# Patient Record
Sex: FEMALE | Race: WHITE | Employment: OTHER | URBAN - METROPOLITAN AREA
[De-identification: names, ages, dates, MRNs, and addresses within clinical notes are randomized per-mention and may not be internally consistent; named-entity substitution may affect disease eponyms.]

---

## 2018-07-23 ENCOUNTER — HOSPITAL ENCOUNTER (OUTPATIENT)
Age: 78
Discharge: HOME | End: 2018-07-25
Payer: MEDICARE

## 2018-07-23 PROCEDURE — 80061 LIPID PANEL: CPT

## 2018-07-23 PROCEDURE — 85025 COMPLETE CBC W/AUTO DIFF WBC: CPT

## 2018-07-23 PROCEDURE — 80053 COMPREHEN METABOLIC PANEL: CPT

## 2018-07-24 LAB
ALBUMIN SERPL-MCNC: 4.2 G/DL (ref 3.5–5.2)
ALP BLD-CCNC: 60 U/L (ref 35–104)
ALT SERPL-CCNC: 11 U/L (ref 0–32)
ANION GAP SERPL CALCULATED.3IONS-SCNC: 19 MMOL/L (ref 7–16)
AST SERPL-CCNC: 22 U/L (ref 0–31)
BASOPHILS ABSOLUTE: 0.04 E9/L (ref 0–0.2)
BASOPHILS RELATIVE PERCENT: 0.7 % (ref 0–2)
BILIRUB SERPL-MCNC: 0.3 MG/DL (ref 0–1.2)
BUN BLDV-MCNC: 15 MG/DL (ref 8–23)
CALCIUM SERPL-MCNC: 9.8 MG/DL (ref 8.6–10.2)
CHLORIDE BLD-SCNC: 97 MMOL/L (ref 98–107)
CHOLESTEROL, TOTAL: 192 MG/DL (ref 0–199)
CO2: 22 MMOL/L (ref 22–29)
CREAT SERPL-MCNC: 0.8 MG/DL (ref 0.5–1)
EOSINOPHILS ABSOLUTE: 0.1 E9/L (ref 0.05–0.5)
EOSINOPHILS RELATIVE PERCENT: 1.7 % (ref 0–6)
GFR AFRICAN AMERICAN: >60
GFR NON-AFRICAN AMERICAN: >60 ML/MIN/1.73
GLUCOSE BLD-MCNC: 73 MG/DL (ref 74–109)
HCT VFR BLD CALC: 38.4 % (ref 34–48)
HDLC SERPL-MCNC: 65 MG/DL
HEMOGLOBIN: 11.9 G/DL (ref 11.5–15.5)
IMMATURE GRANULOCYTES #: 0.02 E9/L
IMMATURE GRANULOCYTES %: 0.3 % (ref 0–5)
LDL CHOLESTEROL CALCULATED: 108 MG/DL (ref 0–99)
LYMPHOCYTES ABSOLUTE: 2.11 E9/L (ref 1.5–4)
LYMPHOCYTES RELATIVE PERCENT: 36.6 % (ref 20–42)
MCH RBC QN AUTO: 28.1 PG (ref 26–35)
MCHC RBC AUTO-ENTMCNC: 31 % (ref 32–34.5)
MCV RBC AUTO: 90.8 FL (ref 80–99.9)
MONOCYTES ABSOLUTE: 0.47 E9/L (ref 0.1–0.95)
MONOCYTES RELATIVE PERCENT: 8.2 % (ref 2–12)
NEUTROPHILS ABSOLUTE: 3.02 E9/L (ref 1.8–7.3)
NEUTROPHILS RELATIVE PERCENT: 52.5 % (ref 43–80)
PDW BLD-RTO: 14.4 FL (ref 11.5–15)
PLATELET # BLD: 360 E9/L (ref 130–450)
PMV BLD AUTO: 10.5 FL (ref 7–12)
POTASSIUM SERPL-SCNC: 4.6 MMOL/L (ref 3.5–5)
RBC # BLD: 4.23 E12/L (ref 3.5–5.5)
SODIUM BLD-SCNC: 138 MMOL/L (ref 132–146)
TOTAL PROTEIN: 7.8 G/DL (ref 6.4–8.3)
TRIGL SERPL-MCNC: 95 MG/DL (ref 0–149)
VLDLC SERPL CALC-MCNC: 19 MG/DL
WBC # BLD: 5.8 E9/L (ref 4.5–11.5)

## 2018-10-22 ENCOUNTER — HOSPITAL ENCOUNTER (OUTPATIENT)
Age: 78
Discharge: HOME | End: 2018-10-24
Payer: MEDICARE

## 2018-10-22 PROCEDURE — 85025 COMPLETE CBC W/AUTO DIFF WBC: CPT

## 2018-10-22 PROCEDURE — 80053 COMPREHEN METABOLIC PANEL: CPT

## 2018-10-22 PROCEDURE — 80061 LIPID PANEL: CPT

## 2018-10-23 LAB
ALBUMIN SERPL-MCNC: 4.2 G/DL (ref 3.5–5.2)
ALP BLD-CCNC: 59 U/L (ref 35–104)
ALT SERPL-CCNC: 11 U/L (ref 0–32)
ANION GAP SERPL CALCULATED.3IONS-SCNC: 15 MMOL/L (ref 7–16)
AST SERPL-CCNC: 17 U/L (ref 0–31)
BASOPHILS ABSOLUTE: 0.06 E9/L (ref 0–0.2)
BASOPHILS RELATIVE PERCENT: 0.8 % (ref 0–2)
BILIRUB SERPL-MCNC: 0.4 MG/DL (ref 0–1.2)
BUN BLDV-MCNC: 18 MG/DL (ref 8–23)
CALCIUM SERPL-MCNC: 9.5 MG/DL (ref 8.6–10.2)
CHLORIDE BLD-SCNC: 98 MMOL/L (ref 98–107)
CHOLESTEROL, TOTAL: 188 MG/DL (ref 0–199)
CO2: 22 MMOL/L (ref 22–29)
CREAT SERPL-MCNC: 0.9 MG/DL (ref 0.5–1)
EOSINOPHILS ABSOLUTE: 0.13 E9/L (ref 0.05–0.5)
EOSINOPHILS RELATIVE PERCENT: 1.8 % (ref 0–6)
GFR AFRICAN AMERICAN: >60
GFR NON-AFRICAN AMERICAN: >60 ML/MIN/1.73
GLUCOSE BLD-MCNC: 80 MG/DL (ref 74–109)
HCT VFR BLD CALC: 37.9 % (ref 34–48)
HDLC SERPL-MCNC: 65 MG/DL
HEMOGLOBIN: 12.3 G/DL (ref 11.5–15.5)
IMMATURE GRANULOCYTES #: 0.03 E9/L
IMMATURE GRANULOCYTES %: 0.4 % (ref 0–5)
LDL CHOLESTEROL CALCULATED: 103 MG/DL (ref 0–99)
LYMPHOCYTES ABSOLUTE: 2.18 E9/L (ref 1.5–4)
LYMPHOCYTES RELATIVE PERCENT: 30.2 % (ref 20–42)
MCH RBC QN AUTO: 28.6 PG (ref 26–35)
MCHC RBC AUTO-ENTMCNC: 32.5 % (ref 32–34.5)
MCV RBC AUTO: 88.1 FL (ref 80–99.9)
MONOCYTES ABSOLUTE: 0.63 E9/L (ref 0.1–0.95)
MONOCYTES RELATIVE PERCENT: 8.7 % (ref 2–12)
NEUTROPHILS ABSOLUTE: 4.2 E9/L (ref 1.8–7.3)
NEUTROPHILS RELATIVE PERCENT: 58.1 % (ref 43–80)
PDW BLD-RTO: 14.4 FL (ref 11.5–15)
PLATELET # BLD: 314 E9/L (ref 130–450)
PMV BLD AUTO: 11.4 FL (ref 7–12)
POTASSIUM SERPL-SCNC: 4.8 MMOL/L (ref 3.5–5)
RBC # BLD: 4.3 E12/L (ref 3.5–5.5)
SODIUM BLD-SCNC: 135 MMOL/L (ref 132–146)
TOTAL PROTEIN: 7.3 G/DL (ref 6.4–8.3)
TRIGL SERPL-MCNC: 99 MG/DL (ref 0–149)
VLDLC SERPL CALC-MCNC: 20 MG/DL
WBC # BLD: 7.2 E9/L (ref 4.5–11.5)

## 2018-10-30 ENCOUNTER — OFFICE VISIT (OUTPATIENT)
Dept: ENT CLINIC | Age: 78
End: 2018-10-30
Payer: MEDICARE

## 2018-10-30 VITALS
BODY MASS INDEX: 24.1 KG/M2 | SYSTOLIC BLOOD PRESSURE: 155 MMHG | WEIGHT: 136 LBS | HEART RATE: 81 BPM | DIASTOLIC BLOOD PRESSURE: 67 MMHG | HEIGHT: 63 IN

## 2018-10-30 DIAGNOSIS — R51.9 REFERRED FACIAL PAIN: Primary | ICD-10-CM

## 2018-10-30 PROCEDURE — 99203 OFFICE O/P NEW LOW 30 MIN: CPT | Performed by: OTOLARYNGOLOGY

## 2018-10-30 RX ORDER — PANTOPRAZOLE SODIUM 40 MG/1
TABLET, DELAYED RELEASE ORAL
COMMUNITY
Start: 2018-10-22 | End: 2019-01-21 | Stop reason: SDUPTHER

## 2018-10-30 ASSESSMENT — ENCOUNTER SYMPTOMS
TROUBLE SWALLOWING: 0
RHINORRHEA: 0
SHORTNESS OF BREATH: 0
SINUS PRESSURE: 0
SINUS PAIN: 0
SORE THROAT: 0

## 2018-11-10 ASSESSMENT — ENCOUNTER SYMPTOMS
VOMITING: 0
COUGH: 0

## 2019-01-21 ENCOUNTER — HOSPITAL ENCOUNTER (OUTPATIENT)
Age: 79
Discharge: HOME | End: 2019-01-23
Payer: MEDICARE

## 2019-01-21 PROBLEM — M47.812 SPONDYLOSIS OF CERVICAL REGION WITHOUT MYELOPATHY OR RADICULOPATHY: Status: ACTIVE | Noted: 2019-01-21

## 2019-01-21 PROBLEM — E78.49 OTHER HYPERLIPIDEMIA: Status: ACTIVE | Noted: 2019-01-21

## 2019-01-21 PROBLEM — K21.9 GASTROESOPHAGEAL REFLUX DISEASE WITHOUT ESOPHAGITIS: Status: ACTIVE | Noted: 2019-01-21

## 2019-01-21 LAB
ALBUMIN SERPL-MCNC: 4 G/DL (ref 3.5–5.2)
ALP BLD-CCNC: 61 U/L (ref 35–104)
ALT SERPL-CCNC: 10 U/L (ref 0–32)
ANION GAP SERPL CALCULATED.3IONS-SCNC: 13 MMOL/L (ref 7–16)
AST SERPL-CCNC: 16 U/L (ref 0–31)
BASOPHILS ABSOLUTE: 0.07 E9/L (ref 0–0.2)
BASOPHILS RELATIVE PERCENT: 1.1 % (ref 0–2)
BILIRUB SERPL-MCNC: 0.4 MG/DL (ref 0–1.2)
BUN BLDV-MCNC: 20 MG/DL (ref 8–23)
CALCIUM SERPL-MCNC: 9.3 MG/DL (ref 8.6–10.2)
CHLORIDE BLD-SCNC: 97 MMOL/L (ref 98–107)
CHOLESTEROL, TOTAL: 192 MG/DL (ref 0–199)
CO2: 25 MMOL/L (ref 22–29)
CREAT SERPL-MCNC: 0.9 MG/DL (ref 0.5–1)
EOSINOPHILS ABSOLUTE: 0.11 E9/L (ref 0.05–0.5)
EOSINOPHILS RELATIVE PERCENT: 1.8 % (ref 0–6)
GFR AFRICAN AMERICAN: >60
GFR NON-AFRICAN AMERICAN: >60 ML/MIN/1.73
GLUCOSE BLD-MCNC: 82 MG/DL (ref 74–99)
HCT VFR BLD CALC: 37.8 % (ref 34–48)
HDLC SERPL-MCNC: 70 MG/DL
HEMOGLOBIN: 12 G/DL (ref 11.5–15.5)
IMMATURE GRANULOCYTES #: 0.01 E9/L
IMMATURE GRANULOCYTES %: 0.2 % (ref 0–5)
LDL CHOLESTEROL CALCULATED: 106 MG/DL (ref 0–99)
LYMPHOCYTES ABSOLUTE: 2.21 E9/L (ref 1.5–4)
LYMPHOCYTES RELATIVE PERCENT: 35.7 % (ref 20–42)
MCH RBC QN AUTO: 28.3 PG (ref 26–35)
MCHC RBC AUTO-ENTMCNC: 31.7 % (ref 32–34.5)
MCV RBC AUTO: 89.2 FL (ref 80–99.9)
MONOCYTES ABSOLUTE: 0.58 E9/L (ref 0.1–0.95)
MONOCYTES RELATIVE PERCENT: 9.4 % (ref 2–12)
NEUTROPHILS ABSOLUTE: 3.21 E9/L (ref 1.8–7.3)
NEUTROPHILS RELATIVE PERCENT: 51.8 % (ref 43–80)
PDW BLD-RTO: 14.1 FL (ref 11.5–15)
PLATELET # BLD: 373 E9/L (ref 130–450)
PMV BLD AUTO: 10.5 FL (ref 7–12)
POTASSIUM SERPL-SCNC: 4.3 MMOL/L (ref 3.5–5)
RBC # BLD: 4.24 E12/L (ref 3.5–5.5)
SODIUM BLD-SCNC: 135 MMOL/L (ref 132–146)
TOTAL PROTEIN: 7.4 G/DL (ref 6.4–8.3)
TRIGL SERPL-MCNC: 81 MG/DL (ref 0–149)
VLDLC SERPL CALC-MCNC: 16 MG/DL
WBC # BLD: 6.2 E9/L (ref 4.5–11.5)

## 2019-01-21 PROCEDURE — 80061 LIPID PANEL: CPT

## 2019-01-21 PROCEDURE — 85025 COMPLETE CBC W/AUTO DIFF WBC: CPT

## 2019-01-21 PROCEDURE — 80053 COMPREHEN METABOLIC PANEL: CPT

## 2019-04-23 ENCOUNTER — HOSPITAL ENCOUNTER (OUTPATIENT)
Age: 79
Discharge: HOME | End: 2019-04-25
Payer: MEDICARE

## 2019-04-23 DIAGNOSIS — E78.49 OTHER HYPERLIPIDEMIA: ICD-10-CM

## 2019-04-23 LAB
ALBUMIN SERPL-MCNC: 4.3 G/DL (ref 3.5–5.2)
ALP BLD-CCNC: 60 U/L (ref 35–104)
ALT SERPL-CCNC: 12 U/L (ref 0–32)
ANION GAP SERPL CALCULATED.3IONS-SCNC: 14 MMOL/L (ref 7–16)
AST SERPL-CCNC: 19 U/L (ref 0–31)
BASOPHILS ABSOLUTE: 0.04 E9/L (ref 0–0.2)
BASOPHILS RELATIVE PERCENT: 0.6 % (ref 0–2)
BILIRUB SERPL-MCNC: 0.3 MG/DL (ref 0–1.2)
BUN BLDV-MCNC: 18 MG/DL (ref 8–23)
CALCIUM SERPL-MCNC: 9.4 MG/DL (ref 8.6–10.2)
CHLORIDE BLD-SCNC: 100 MMOL/L (ref 98–107)
CHOLESTEROL, TOTAL: 165 MG/DL (ref 0–199)
CO2: 23 MMOL/L (ref 22–29)
CREAT SERPL-MCNC: 0.8 MG/DL (ref 0.5–1)
EOSINOPHILS ABSOLUTE: 0.15 E9/L (ref 0.05–0.5)
EOSINOPHILS RELATIVE PERCENT: 2.2 % (ref 0–6)
GFR AFRICAN AMERICAN: >60
GFR NON-AFRICAN AMERICAN: >60 ML/MIN/1.73
GLUCOSE BLD-MCNC: 82 MG/DL (ref 74–99)
HCT VFR BLD CALC: 38.3 % (ref 34–48)
HDLC SERPL-MCNC: 68 MG/DL
HEMOGLOBIN: 11.8 G/DL (ref 11.5–15.5)
IMMATURE GRANULOCYTES #: 0.02 E9/L
IMMATURE GRANULOCYTES %: 0.3 % (ref 0–5)
LDL CHOLESTEROL CALCULATED: 82 MG/DL (ref 0–99)
LYMPHOCYTES ABSOLUTE: 2.17 E9/L (ref 1.5–4)
LYMPHOCYTES RELATIVE PERCENT: 32.1 % (ref 20–42)
MCH RBC QN AUTO: 27.6 PG (ref 26–35)
MCHC RBC AUTO-ENTMCNC: 30.8 % (ref 32–34.5)
MCV RBC AUTO: 89.5 FL (ref 80–99.9)
MONOCYTES ABSOLUTE: 0.54 E9/L (ref 0.1–0.95)
MONOCYTES RELATIVE PERCENT: 8 % (ref 2–12)
NEUTROPHILS ABSOLUTE: 3.83 E9/L (ref 1.8–7.3)
NEUTROPHILS RELATIVE PERCENT: 56.8 % (ref 43–80)
PDW BLD-RTO: 14.6 FL (ref 11.5–15)
PLATELET # BLD: 337 E9/L (ref 130–450)
PMV BLD AUTO: 10.3 FL (ref 7–12)
POTASSIUM SERPL-SCNC: 4.9 MMOL/L (ref 3.5–5)
RBC # BLD: 4.28 E12/L (ref 3.5–5.5)
SODIUM BLD-SCNC: 137 MMOL/L (ref 132–146)
TOTAL PROTEIN: 7.3 G/DL (ref 6.4–8.3)
TRIGL SERPL-MCNC: 77 MG/DL (ref 0–149)
VLDLC SERPL CALC-MCNC: 15 MG/DL
WBC # BLD: 6.8 E9/L (ref 4.5–11.5)

## 2019-04-23 PROCEDURE — 85025 COMPLETE CBC W/AUTO DIFF WBC: CPT

## 2019-04-23 PROCEDURE — 80053 COMPREHEN METABOLIC PANEL: CPT

## 2019-04-23 PROCEDURE — 80061 LIPID PANEL: CPT

## 2019-07-29 ENCOUNTER — HOSPITAL ENCOUNTER (OUTPATIENT)
Age: 79
Discharge: HOME | End: 2019-07-31
Payer: MEDICARE

## 2019-07-29 DIAGNOSIS — E78.49 OTHER HYPERLIPIDEMIA: ICD-10-CM

## 2019-07-29 LAB
ALBUMIN SERPL-MCNC: 4.6 G/DL (ref 3.5–5.2)
ALP BLD-CCNC: 52 U/L (ref 35–104)
ALT SERPL-CCNC: 11 U/L (ref 0–32)
ANION GAP SERPL CALCULATED.3IONS-SCNC: 15 MMOL/L (ref 7–16)
AST SERPL-CCNC: 18 U/L (ref 0–31)
BILIRUB SERPL-MCNC: 0.3 MG/DL (ref 0–1.2)
BUN BLDV-MCNC: 17 MG/DL (ref 8–23)
CALCIUM SERPL-MCNC: 9.7 MG/DL (ref 8.6–10.2)
CHLORIDE BLD-SCNC: 99 MMOL/L (ref 98–107)
CHOLESTEROL, TOTAL: 179 MG/DL (ref 0–199)
CO2: 24 MMOL/L (ref 22–29)
CREAT SERPL-MCNC: 0.8 MG/DL (ref 0.5–1)
GFR AFRICAN AMERICAN: >60
GFR NON-AFRICAN AMERICAN: >60 ML/MIN/1.73
GLUCOSE BLD-MCNC: 79 MG/DL (ref 74–99)
HDLC SERPL-MCNC: 65 MG/DL
LDL CHOLESTEROL CALCULATED: 97 MG/DL (ref 0–99)
POTASSIUM SERPL-SCNC: 4.7 MMOL/L (ref 3.5–5)
SODIUM BLD-SCNC: 138 MMOL/L (ref 132–146)
TOTAL PROTEIN: 7.4 G/DL (ref 6.4–8.3)
TRIGL SERPL-MCNC: 87 MG/DL (ref 0–149)
VLDLC SERPL CALC-MCNC: 17 MG/DL

## 2019-07-29 PROCEDURE — 80053 COMPREHEN METABOLIC PANEL: CPT

## 2019-07-29 PROCEDURE — 85025 COMPLETE CBC W/AUTO DIFF WBC: CPT

## 2019-07-29 PROCEDURE — 80061 LIPID PANEL: CPT

## 2019-07-30 LAB
BASOPHILS ABSOLUTE: 0 E9/L (ref 0–0.2)
BASOPHILS RELATIVE PERCENT: 0.9 % (ref 0–2)
BURR CELLS: ABNORMAL
EOSINOPHILS ABSOLUTE: 0.05 E9/L (ref 0.05–0.5)
EOSINOPHILS RELATIVE PERCENT: 0.9 % (ref 0–6)
HCT VFR BLD CALC: 37.5 % (ref 34–48)
HEMOGLOBIN: 11.5 G/DL (ref 11.5–15.5)
LYMPHOCYTES ABSOLUTE: 2.26 E9/L (ref 1.5–4)
LYMPHOCYTES RELATIVE PERCENT: 40.7 % (ref 20–42)
MCH RBC QN AUTO: 28.6 PG (ref 26–35)
MCHC RBC AUTO-ENTMCNC: 30.7 % (ref 32–34.5)
MCV RBC AUTO: 93.3 FL (ref 80–99.9)
MONOCYTES ABSOLUTE: 0.33 E9/L (ref 0.1–0.95)
MONOCYTES RELATIVE PERCENT: 6.2 % (ref 2–12)
NEUTROPHILS ABSOLUTE: 2.86 E9/L (ref 1.8–7.3)
NEUTROPHILS RELATIVE PERCENT: 52.2 % (ref 43–80)
PDW BLD-RTO: 14.3 FL (ref 11.5–15)
PLATELET # BLD: 317 E9/L (ref 130–450)
PMV BLD AUTO: 10.6 FL (ref 7–12)
RBC # BLD: 4.02 E12/L (ref 3.5–5.5)
WBC # BLD: 5.5 E9/L (ref 4.5–11.5)

## 2019-11-01 ENCOUNTER — HOSPITAL ENCOUNTER (OUTPATIENT)
Age: 79
Discharge: HOME | End: 2019-11-03
Payer: MEDICARE

## 2019-11-01 DIAGNOSIS — E78.49 OTHER HYPERLIPIDEMIA: ICD-10-CM

## 2019-11-01 DIAGNOSIS — E55.9 VITAMIN D DEFICIENCY: ICD-10-CM

## 2019-11-01 PROBLEM — M48.061 SPINAL STENOSIS OF LUMBAR REGION WITHOUT NEUROGENIC CLAUDICATION: Status: ACTIVE | Noted: 2019-11-01

## 2019-11-01 PROBLEM — M50.20 HNP (HERNIATED NUCLEUS PULPOSUS), CERVICAL: Status: ACTIVE | Noted: 2019-11-01

## 2019-11-01 LAB
BASOPHILS ABSOLUTE: 0.05 E9/L (ref 0–0.2)
BASOPHILS RELATIVE PERCENT: 0.9 % (ref 0–2)
EOSINOPHILS ABSOLUTE: 0.08 E9/L (ref 0.05–0.5)
EOSINOPHILS RELATIVE PERCENT: 1.5 % (ref 0–6)
HCT VFR BLD CALC: 38.9 % (ref 34–48)
HEMOGLOBIN: 11.7 G/DL (ref 11.5–15.5)
IMMATURE GRANULOCYTES #: 0 E9/L
IMMATURE GRANULOCYTES %: 0 % (ref 0–5)
LYMPHOCYTES ABSOLUTE: 2.2 E9/L (ref 1.5–4)
LYMPHOCYTES RELATIVE PERCENT: 40.9 % (ref 20–42)
MCH RBC QN AUTO: 27.4 PG (ref 26–35)
MCHC RBC AUTO-ENTMCNC: 30.1 % (ref 32–34.5)
MCV RBC AUTO: 91.1 FL (ref 80–99.9)
MONOCYTES ABSOLUTE: 0.51 E9/L (ref 0.1–0.95)
MONOCYTES RELATIVE PERCENT: 9.5 % (ref 2–12)
NEUTROPHILS ABSOLUTE: 2.54 E9/L (ref 1.8–7.3)
NEUTROPHILS RELATIVE PERCENT: 47.2 % (ref 43–80)
PDW BLD-RTO: 13.6 FL (ref 11.5–15)
PLATELET # BLD: 355 E9/L (ref 130–450)
PMV BLD AUTO: 10.9 FL (ref 7–12)
RBC # BLD: 4.27 E12/L (ref 3.5–5.5)
WBC # BLD: 5.4 E9/L (ref 4.5–11.5)

## 2019-11-01 PROCEDURE — 82306 VITAMIN D 25 HYDROXY: CPT

## 2019-11-01 PROCEDURE — 85025 COMPLETE CBC W/AUTO DIFF WBC: CPT

## 2019-11-01 PROCEDURE — 80061 LIPID PANEL: CPT

## 2019-11-01 PROCEDURE — 80053 COMPREHEN METABOLIC PANEL: CPT

## 2019-11-02 LAB
ALBUMIN SERPL-MCNC: 4.6 G/DL (ref 3.5–5.2)
ALP BLD-CCNC: 65 U/L (ref 35–104)
ALT SERPL-CCNC: 11 U/L (ref 0–32)
ANION GAP SERPL CALCULATED.3IONS-SCNC: 15 MMOL/L (ref 7–16)
AST SERPL-CCNC: 21 U/L (ref 0–31)
BILIRUB SERPL-MCNC: 0.3 MG/DL (ref 0–1.2)
BUN BLDV-MCNC: 21 MG/DL (ref 8–23)
CALCIUM SERPL-MCNC: 9.5 MG/DL (ref 8.6–10.2)
CHLORIDE BLD-SCNC: 97 MMOL/L (ref 98–107)
CHOLESTEROL, TOTAL: 166 MG/DL (ref 0–199)
CO2: 24 MMOL/L (ref 22–29)
CREAT SERPL-MCNC: 0.9 MG/DL (ref 0.5–1)
GFR AFRICAN AMERICAN: >60
GFR NON-AFRICAN AMERICAN: >60 ML/MIN/1.73
GLUCOSE BLD-MCNC: 71 MG/DL (ref 74–99)
HDLC SERPL-MCNC: 64 MG/DL
LDL CHOLESTEROL CALCULATED: 84 MG/DL (ref 0–99)
POTASSIUM SERPL-SCNC: 4.9 MMOL/L (ref 3.5–5)
SODIUM BLD-SCNC: 136 MMOL/L (ref 132–146)
TOTAL PROTEIN: 7.6 G/DL (ref 6.4–8.3)
TRIGL SERPL-MCNC: 89 MG/DL (ref 0–149)
VITAMIN D 25-HYDROXY: 24 NG/ML (ref 30–100)
VLDLC SERPL CALC-MCNC: 18 MG/DL

## 2020-02-07 ENCOUNTER — HOSPITAL ENCOUNTER (OUTPATIENT)
Age: 80
Discharge: HOME | End: 2020-02-09
Payer: MEDICARE

## 2020-02-07 PROCEDURE — 80061 LIPID PANEL: CPT

## 2020-02-07 PROCEDURE — 85025 COMPLETE CBC W/AUTO DIFF WBC: CPT

## 2020-02-07 PROCEDURE — 80053 COMPREHEN METABOLIC PANEL: CPT

## 2020-02-08 LAB
ALBUMIN SERPL-MCNC: 4.5 G/DL (ref 3.5–5.2)
ALP BLD-CCNC: 62 U/L (ref 35–104)
ALT SERPL-CCNC: 11 U/L (ref 0–32)
ANION GAP SERPL CALCULATED.3IONS-SCNC: 16 MMOL/L (ref 7–16)
AST SERPL-CCNC: 18 U/L (ref 0–31)
BASOPHILS ABSOLUTE: 0.04 E9/L (ref 0–0.2)
BASOPHILS RELATIVE PERCENT: 0.6 % (ref 0–2)
BILIRUB SERPL-MCNC: 0.2 MG/DL (ref 0–1.2)
BUN BLDV-MCNC: 19 MG/DL (ref 8–23)
CALCIUM SERPL-MCNC: 9.8 MG/DL (ref 8.6–10.2)
CHLORIDE BLD-SCNC: 99 MMOL/L (ref 98–107)
CHOLESTEROL, TOTAL: 170 MG/DL (ref 0–199)
CO2: 22 MMOL/L (ref 22–29)
CREAT SERPL-MCNC: 0.8 MG/DL (ref 0.5–1)
EOSINOPHILS ABSOLUTE: 0.08 E9/L (ref 0.05–0.5)
EOSINOPHILS RELATIVE PERCENT: 1.3 % (ref 0–6)
GFR AFRICAN AMERICAN: >60
GFR NON-AFRICAN AMERICAN: >60 ML/MIN/1.73
GLUCOSE BLD-MCNC: 72 MG/DL (ref 74–99)
HCT VFR BLD CALC: 38.7 % (ref 34–48)
HDLC SERPL-MCNC: 75 MG/DL
HEMOGLOBIN: 11.9 G/DL (ref 11.5–15.5)
IMMATURE GRANULOCYTES #: 0.01 E9/L
IMMATURE GRANULOCYTES %: 0.2 % (ref 0–5)
LDL CHOLESTEROL CALCULATED: 81 MG/DL (ref 0–99)
LYMPHOCYTES ABSOLUTE: 1.76 E9/L (ref 1.5–4)
LYMPHOCYTES RELATIVE PERCENT: 27.6 % (ref 20–42)
MCH RBC QN AUTO: 28 PG (ref 26–35)
MCHC RBC AUTO-ENTMCNC: 30.7 % (ref 32–34.5)
MCV RBC AUTO: 91.1 FL (ref 80–99.9)
MONOCYTES ABSOLUTE: 0.53 E9/L (ref 0.1–0.95)
MONOCYTES RELATIVE PERCENT: 8.3 % (ref 2–12)
NEUTROPHILS ABSOLUTE: 3.96 E9/L (ref 1.8–7.3)
NEUTROPHILS RELATIVE PERCENT: 62 % (ref 43–80)
PDW BLD-RTO: 14 FL (ref 11.5–15)
PLATELET # BLD: 340 E9/L (ref 130–450)
PMV BLD AUTO: 10.8 FL (ref 7–12)
POTASSIUM SERPL-SCNC: 4.3 MMOL/L (ref 3.5–5)
RBC # BLD: 4.25 E12/L (ref 3.5–5.5)
SODIUM BLD-SCNC: 137 MMOL/L (ref 132–146)
TOTAL PROTEIN: 7.9 G/DL (ref 6.4–8.3)
TRIGL SERPL-MCNC: 69 MG/DL (ref 0–149)
VLDLC SERPL CALC-MCNC: 14 MG/DL
WBC # BLD: 6.4 E9/L (ref 4.5–11.5)

## 2020-05-22 ENCOUNTER — HOSPITAL ENCOUNTER (OUTPATIENT)
Age: 80
Discharge: HOME | End: 2020-05-24
Payer: MEDICARE

## 2020-05-22 LAB
ALBUMIN SERPL-MCNC: 4.5 G/DL (ref 3.5–5.2)
ALP BLD-CCNC: 57 U/L (ref 35–104)
ALT SERPL-CCNC: 10 U/L (ref 0–32)
ANION GAP SERPL CALCULATED.3IONS-SCNC: 16 MMOL/L (ref 7–16)
AST SERPL-CCNC: 18 U/L (ref 0–31)
BASOPHILS ABSOLUTE: 0.03 E9/L (ref 0–0.2)
BASOPHILS RELATIVE PERCENT: 0.4 % (ref 0–2)
BILIRUB SERPL-MCNC: 0.3 MG/DL (ref 0–1.2)
BUN BLDV-MCNC: 20 MG/DL (ref 8–23)
CALCIUM SERPL-MCNC: 9.6 MG/DL (ref 8.6–10.2)
CHLORIDE BLD-SCNC: 97 MMOL/L (ref 98–107)
CHOLESTEROL, TOTAL: 170 MG/DL (ref 0–199)
CO2: 23 MMOL/L (ref 22–29)
CREAT SERPL-MCNC: 0.8 MG/DL (ref 0.5–1)
EOSINOPHILS ABSOLUTE: 0.07 E9/L (ref 0.05–0.5)
EOSINOPHILS RELATIVE PERCENT: 1 % (ref 0–6)
GFR AFRICAN AMERICAN: >60
GFR NON-AFRICAN AMERICAN: >60 ML/MIN/1.73
GLUCOSE BLD-MCNC: 78 MG/DL (ref 74–99)
HCT VFR BLD CALC: 37.7 % (ref 34–48)
HDLC SERPL-MCNC: 67 MG/DL
HEMOGLOBIN: 11.7 G/DL (ref 11.5–15.5)
IMMATURE GRANULOCYTES #: 0.01 E9/L
IMMATURE GRANULOCYTES %: 0.1 % (ref 0–5)
LDL CHOLESTEROL CALCULATED: 83 MG/DL (ref 0–99)
LYMPHOCYTES ABSOLUTE: 2.04 E9/L (ref 1.5–4)
LYMPHOCYTES RELATIVE PERCENT: 28.5 % (ref 20–42)
MCH RBC QN AUTO: 28.3 PG (ref 26–35)
MCHC RBC AUTO-ENTMCNC: 31 % (ref 32–34.5)
MCV RBC AUTO: 91.1 FL (ref 80–99.9)
MONOCYTES ABSOLUTE: 0.46 E9/L (ref 0.1–0.95)
MONOCYTES RELATIVE PERCENT: 6.4 % (ref 2–12)
NEUTROPHILS ABSOLUTE: 4.54 E9/L (ref 1.8–7.3)
NEUTROPHILS RELATIVE PERCENT: 63.6 % (ref 43–80)
PDW BLD-RTO: 14.2 FL (ref 11.5–15)
PLATELET # BLD: 326 E9/L (ref 130–450)
PMV BLD AUTO: 10.2 FL (ref 7–12)
POTASSIUM SERPL-SCNC: 4.5 MMOL/L (ref 3.5–5)
RBC # BLD: 4.14 E12/L (ref 3.5–5.5)
SODIUM BLD-SCNC: 136 MMOL/L (ref 132–146)
TOTAL PROTEIN: 7.8 G/DL (ref 6.4–8.3)
TRIGL SERPL-MCNC: 98 MG/DL (ref 0–149)
VLDLC SERPL CALC-MCNC: 20 MG/DL
WBC # BLD: 7.2 E9/L (ref 4.5–11.5)

## 2020-05-22 PROCEDURE — 85025 COMPLETE CBC W/AUTO DIFF WBC: CPT

## 2020-05-22 PROCEDURE — 80061 LIPID PANEL: CPT

## 2020-05-22 PROCEDURE — 80053 COMPREHEN METABOLIC PANEL: CPT

## 2020-08-25 ENCOUNTER — HOSPITAL ENCOUNTER (OUTPATIENT)
Age: 80
Discharge: HOME | End: 2020-08-27
Payer: MEDICARE

## 2020-08-25 LAB
ALBUMIN SERPL-MCNC: 4.3 G/DL (ref 3.5–5.2)
ALP BLD-CCNC: 56 U/L (ref 35–104)
ALT SERPL-CCNC: 8 U/L (ref 0–32)
ANION GAP SERPL CALCULATED.3IONS-SCNC: 15 MMOL/L (ref 7–16)
AST SERPL-CCNC: 16 U/L (ref 0–31)
BASOPHILS ABSOLUTE: 0.05 E9/L (ref 0–0.2)
BASOPHILS RELATIVE PERCENT: 0.7 % (ref 0–2)
BILIRUB SERPL-MCNC: 0.4 MG/DL (ref 0–1.2)
BUN BLDV-MCNC: 18 MG/DL (ref 8–23)
CALCIUM SERPL-MCNC: 9.7 MG/DL (ref 8.6–10.2)
CHLORIDE BLD-SCNC: 98 MMOL/L (ref 98–107)
CHOLESTEROL, TOTAL: 163 MG/DL (ref 0–199)
CO2: 23 MMOL/L (ref 22–29)
CREAT SERPL-MCNC: 0.8 MG/DL (ref 0.5–1)
EOSINOPHILS ABSOLUTE: 0.2 E9/L (ref 0.05–0.5)
EOSINOPHILS RELATIVE PERCENT: 2.9 % (ref 0–6)
GFR AFRICAN AMERICAN: >60
GFR NON-AFRICAN AMERICAN: >60 ML/MIN/1.73
GLUCOSE BLD-MCNC: 74 MG/DL (ref 74–99)
HCT VFR BLD CALC: 36.6 % (ref 34–48)
HDLC SERPL-MCNC: 67 MG/DL
HEMOGLOBIN: 11.3 G/DL (ref 11.5–15.5)
IMMATURE GRANULOCYTES #: 0.02 E9/L
IMMATURE GRANULOCYTES %: 0.3 % (ref 0–5)
LDL CHOLESTEROL CALCULATED: 76 MG/DL (ref 0–99)
LYMPHOCYTES ABSOLUTE: 2.29 E9/L (ref 1.5–4)
LYMPHOCYTES RELATIVE PERCENT: 33.2 % (ref 20–42)
MCH RBC QN AUTO: 27.8 PG (ref 26–35)
MCHC RBC AUTO-ENTMCNC: 30.9 % (ref 32–34.5)
MCV RBC AUTO: 89.9 FL (ref 80–99.9)
MONOCYTES ABSOLUTE: 0.64 E9/L (ref 0.1–0.95)
MONOCYTES RELATIVE PERCENT: 9.3 % (ref 2–12)
NEUTROPHILS ABSOLUTE: 3.69 E9/L (ref 1.8–7.3)
NEUTROPHILS RELATIVE PERCENT: 53.6 % (ref 43–80)
PDW BLD-RTO: 14.1 FL (ref 11.5–15)
PLATELET # BLD: 317 E9/L (ref 130–450)
PMV BLD AUTO: 10.2 FL (ref 7–12)
POTASSIUM SERPL-SCNC: 4.6 MMOL/L (ref 3.5–5)
RBC # BLD: 4.07 E12/L (ref 3.5–5.5)
SODIUM BLD-SCNC: 136 MMOL/L (ref 132–146)
TOTAL PROTEIN: 7.3 G/DL (ref 6.4–8.3)
TRIGL SERPL-MCNC: 98 MG/DL (ref 0–149)
VITAMIN D 25-HYDROXY: 33 NG/ML (ref 30–100)
VLDLC SERPL CALC-MCNC: 20 MG/DL
WBC # BLD: 6.9 E9/L (ref 4.5–11.5)

## 2020-08-25 PROCEDURE — 85025 COMPLETE CBC W/AUTO DIFF WBC: CPT

## 2020-08-25 PROCEDURE — 80053 COMPREHEN METABOLIC PANEL: CPT

## 2020-08-25 PROCEDURE — 82306 VITAMIN D 25 HYDROXY: CPT

## 2020-08-25 PROCEDURE — 80061 LIPID PANEL: CPT

## 2020-12-02 DIAGNOSIS — Z00.00 ROUTINE GENERAL MEDICAL EXAMINATION AT A HEALTH CARE FACILITY: ICD-10-CM

## 2020-12-02 LAB
ALBUMIN SERPL-MCNC: 4.5 G/DL (ref 3.5–5.2)
ALP BLD-CCNC: 55 U/L (ref 35–104)
ALT SERPL-CCNC: 9 U/L (ref 0–32)
ANION GAP SERPL CALCULATED.3IONS-SCNC: 11 MMOL/L (ref 7–16)
AST SERPL-CCNC: 17 U/L (ref 0–31)
BASOPHILS ABSOLUTE: 0.05 E9/L (ref 0–0.2)
BASOPHILS RELATIVE PERCENT: 0.7 % (ref 0–2)
BILIRUB SERPL-MCNC: 0.3 MG/DL (ref 0–1.2)
BUN BLDV-MCNC: 17 MG/DL (ref 8–23)
CALCIUM SERPL-MCNC: 9.6 MG/DL (ref 8.6–10.2)
CHLORIDE BLD-SCNC: 97 MMOL/L (ref 98–107)
CHOLESTEROL, TOTAL: 197 MG/DL (ref 0–199)
CO2: 26 MMOL/L (ref 22–29)
CREAT SERPL-MCNC: 0.8 MG/DL (ref 0.5–1)
EOSINOPHILS ABSOLUTE: 0.07 E9/L (ref 0.05–0.5)
EOSINOPHILS RELATIVE PERCENT: 1 % (ref 0–6)
GFR AFRICAN AMERICAN: >60
GFR NON-AFRICAN AMERICAN: >60 ML/MIN/1.73
GLUCOSE BLD-MCNC: 82 MG/DL (ref 74–99)
HCT VFR BLD CALC: 38.7 % (ref 34–48)
HDLC SERPL-MCNC: 74 MG/DL
HEMOGLOBIN: 12.1 G/DL (ref 11.5–15.5)
IMMATURE GRANULOCYTES #: 0.02 E9/L
IMMATURE GRANULOCYTES %: 0.3 % (ref 0–5)
LDL CHOLESTEROL CALCULATED: 105 MG/DL (ref 0–99)
LYMPHOCYTES ABSOLUTE: 2.06 E9/L (ref 1.5–4)
LYMPHOCYTES RELATIVE PERCENT: 29.2 % (ref 20–42)
MCH RBC QN AUTO: 28.4 PG (ref 26–35)
MCHC RBC AUTO-ENTMCNC: 31.3 % (ref 32–34.5)
MCV RBC AUTO: 90.8 FL (ref 80–99.9)
MONOCYTES ABSOLUTE: 0.51 E9/L (ref 0.1–0.95)
MONOCYTES RELATIVE PERCENT: 7.2 % (ref 2–12)
NEUTROPHILS ABSOLUTE: 4.35 E9/L (ref 1.8–7.3)
NEUTROPHILS RELATIVE PERCENT: 61.6 % (ref 43–80)
PDW BLD-RTO: 14.5 FL (ref 11.5–15)
PLATELET # BLD: 384 E9/L (ref 130–450)
PMV BLD AUTO: 10.3 FL (ref 7–12)
POTASSIUM SERPL-SCNC: 4.3 MMOL/L (ref 3.5–5)
RBC # BLD: 4.26 E12/L (ref 3.5–5.5)
SODIUM BLD-SCNC: 134 MMOL/L (ref 132–146)
TOTAL PROTEIN: 7.4 G/DL (ref 6.4–8.3)
TRIGL SERPL-MCNC: 91 MG/DL (ref 0–149)
VLDLC SERPL CALC-MCNC: 18 MG/DL
WBC # BLD: 7.1 E9/L (ref 4.5–11.5)

## 2022-05-23 ENCOUNTER — HOSPITAL ENCOUNTER (OUTPATIENT)
Age: 82
Discharge: HOME | End: 2022-05-25

## 2022-05-23 PROCEDURE — 88307 TISSUE EXAM BY PATHOLOGIST: CPT

## 2023-09-29 PROBLEM — I10 ESSENTIAL HYPERTENSION: Status: ACTIVE | Noted: 2023-09-29

## 2023-09-29 PROBLEM — R06.09 DOE (DYSPNEA ON EXERTION): Status: ACTIVE | Noted: 2023-09-29

## 2024-06-12 ENCOUNTER — HOSPITAL ENCOUNTER (INPATIENT)
Age: 84
LOS: 2 days | Discharge: HOME | End: 2024-06-14
Attending: EMERGENCY MEDICINE | Admitting: STUDENT IN AN ORGANIZED HEALTH CARE EDUCATION/TRAINING PROGRAM
Payer: MEDICARE

## 2024-06-12 ENCOUNTER — APPOINTMENT (OUTPATIENT)
Dept: GENERAL RADIOLOGY | Age: 84
End: 2024-06-12
Payer: MEDICARE

## 2024-06-12 ENCOUNTER — APPOINTMENT (OUTPATIENT)
Dept: CT IMAGING | Age: 84
End: 2024-06-12
Payer: MEDICARE

## 2024-06-12 DIAGNOSIS — R06.09 DOE (DYSPNEA ON EXERTION): ICD-10-CM

## 2024-06-12 DIAGNOSIS — R55 SYNCOPE AND COLLAPSE: Primary | ICD-10-CM

## 2024-06-12 LAB
ALBUMIN SERPL-MCNC: 4 G/DL (ref 3.5–5.2)
ALP SERPL-CCNC: 73 U/L (ref 35–104)
ALT SERPL-CCNC: 20 U/L (ref 0–32)
ANION GAP SERPL CALCULATED.3IONS-SCNC: 15 MMOL/L (ref 7–16)
AST SERPL-CCNC: 38 U/L (ref 0–31)
BASOPHILS # BLD: 0.04 K/UL (ref 0–0.2)
BASOPHILS NFR BLD: 0 % (ref 0–2)
BILIRUB SERPL-MCNC: 0.2 MG/DL (ref 0–1.2)
BUN SERPL-MCNC: 23 MG/DL (ref 6–23)
CALCIUM SERPL-MCNC: 8.9 MG/DL (ref 8.6–10.2)
CHLORIDE SERPL-SCNC: 98 MMOL/L (ref 98–107)
CO2 SERPL-SCNC: 21 MMOL/L (ref 22–29)
CREAT SERPL-MCNC: 0.8 MG/DL (ref 0.5–1)
EKG ATRIAL RATE: 93 BPM
EKG P AXIS: 60 DEGREES
EKG P-R INTERVAL: 146 MS
EKG Q-T INTERVAL: 382 MS
EKG QRS DURATION: 132 MS
EKG QTC CALCULATION (BAZETT): 474 MS
EKG R AXIS: 54 DEGREES
EKG T AXIS: 34 DEGREES
EKG VENTRICULAR RATE: 93 BPM
EOSINOPHIL # BLD: 0.04 K/UL (ref 0.05–0.5)
EOSINOPHILS RELATIVE PERCENT: 0 % (ref 0–6)
ERYTHROCYTE [DISTWIDTH] IN BLOOD BY AUTOMATED COUNT: 14.6 % (ref 11.5–15)
GFR, ESTIMATED: 73 ML/MIN/1.73M2
GLUCOSE SERPL-MCNC: 134 MG/DL (ref 74–99)
HCT VFR BLD AUTO: 35.2 % (ref 34–48)
HGB BLD-MCNC: 11.2 G/DL (ref 11.5–15.5)
IMM GRANULOCYTES # BLD AUTO: 0.04 K/UL (ref 0–0.58)
IMM GRANULOCYTES NFR BLD: 0 % (ref 0–5)
LYMPHOCYTES NFR BLD: 1.2 K/UL (ref 1.5–4)
LYMPHOCYTES RELATIVE PERCENT: 14 % (ref 20–42)
MAGNESIUM SERPL-MCNC: 2.2 MG/DL (ref 1.6–2.6)
MAGNESIUM SERPL-MCNC: 2.2 MG/DL (ref 1.6–2.6)
MCH RBC QN AUTO: 27.8 PG (ref 26–35)
MCHC RBC AUTO-ENTMCNC: 31.8 G/DL (ref 32–34.5)
MCV RBC AUTO: 87.3 FL (ref 80–99.9)
MONOCYTES NFR BLD: 0.68 K/UL (ref 0.1–0.95)
MONOCYTES NFR BLD: 8 % (ref 2–12)
NEUTROPHILS NFR BLD: 78 % (ref 43–80)
NEUTS SEG NFR BLD: 6.9 K/UL (ref 1.8–7.3)
PLATELET # BLD AUTO: 316 K/UL (ref 130–450)
PMV BLD AUTO: 9.5 FL (ref 7–12)
POTASSIUM SERPL-SCNC: 4.7 MMOL/L (ref 3.5–5)
PROT SERPL-MCNC: 7.5 G/DL (ref 6.4–8.3)
RBC # BLD AUTO: 4.03 M/UL (ref 3.5–5.5)
SODIUM SERPL-SCNC: 134 MMOL/L (ref 132–146)
TROPONIN I SERPL HS-MCNC: 24 NG/L (ref 0–9)
TROPONIN I SERPL HS-MCNC: 36 NG/L (ref 0–9)
TSH SERPL DL<=0.05 MIU/L-ACNC: 0.05 UIU/ML (ref 0.27–4.2)
WBC OTHER # BLD: 8.9 K/UL (ref 4.5–11.5)

## 2024-06-12 PROCEDURE — 93005 ELECTROCARDIOGRAM TRACING: CPT

## 2024-06-12 PROCEDURE — 80053 COMPREHEN METABOLIC PANEL: CPT

## 2024-06-12 PROCEDURE — 6360000002 HC RX W HCPCS: Performed by: STUDENT IN AN ORGANIZED HEALTH CARE EDUCATION/TRAINING PROGRAM

## 2024-06-12 PROCEDURE — 99285 EMERGENCY DEPT VISIT HI MDM: CPT

## 2024-06-12 PROCEDURE — 84484 ASSAY OF TROPONIN QUANT: CPT

## 2024-06-12 PROCEDURE — 83735 ASSAY OF MAGNESIUM: CPT

## 2024-06-12 PROCEDURE — 93010 ELECTROCARDIOGRAM REPORT: CPT | Performed by: INTERNAL MEDICINE

## 2024-06-12 PROCEDURE — 84443 ASSAY THYROID STIM HORMONE: CPT

## 2024-06-12 PROCEDURE — 2060000000 HC ICU INTERMEDIATE R&B

## 2024-06-12 PROCEDURE — 70450 CT HEAD/BRAIN W/O DYE: CPT

## 2024-06-12 PROCEDURE — 71045 X-RAY EXAM CHEST 1 VIEW: CPT

## 2024-06-12 PROCEDURE — 73502 X-RAY EXAM HIP UNI 2-3 VIEWS: CPT

## 2024-06-12 PROCEDURE — 72125 CT NECK SPINE W/O DYE: CPT

## 2024-06-12 PROCEDURE — 6370000000 HC RX 637 (ALT 250 FOR IP): Performed by: STUDENT IN AN ORGANIZED HEALTH CARE EDUCATION/TRAINING PROGRAM

## 2024-06-12 PROCEDURE — 85025 COMPLETE CBC W/AUTO DIFF WBC: CPT

## 2024-06-12 PROCEDURE — 99222 1ST HOSP IP/OBS MODERATE 55: CPT | Performed by: STUDENT IN AN ORGANIZED HEALTH CARE EDUCATION/TRAINING PROGRAM

## 2024-06-12 RX ORDER — ONDANSETRON 2 MG/ML
4 INJECTION INTRAMUSCULAR; INTRAVENOUS EVERY 6 HOURS PRN
Status: DISCONTINUED | OUTPATIENT
Start: 2024-06-12 | End: 2024-06-14 | Stop reason: HOSPADM

## 2024-06-12 RX ORDER — SODIUM CHLORIDE 0.9 % (FLUSH) 0.9 %
10 SYRINGE (ML) INJECTION PRN
Status: DISCONTINUED | OUTPATIENT
Start: 2024-06-12 | End: 2024-06-14 | Stop reason: HOSPADM

## 2024-06-12 RX ORDER — ONDANSETRON 2 MG/ML
4 INJECTION INTRAMUSCULAR; INTRAVENOUS ONCE
Status: DISCONTINUED | OUTPATIENT
Start: 2024-06-12 | End: 2024-06-14 | Stop reason: HOSPADM

## 2024-06-12 RX ORDER — AMLODIPINE BESYLATE 10 MG/1
10 TABLET ORAL DAILY
Status: DISCONTINUED | OUTPATIENT
Start: 2024-06-12 | End: 2024-06-14 | Stop reason: HOSPADM

## 2024-06-12 RX ORDER — POTASSIUM CHLORIDE 20 MEQ/1
40 TABLET, EXTENDED RELEASE ORAL PRN
Status: DISCONTINUED | OUTPATIENT
Start: 2024-06-12 | End: 2024-06-14 | Stop reason: HOSPADM

## 2024-06-12 RX ORDER — ACETAMINOPHEN 325 MG/1
650 TABLET ORAL EVERY 6 HOURS PRN
Status: DISCONTINUED | OUTPATIENT
Start: 2024-06-12 | End: 2024-06-14 | Stop reason: HOSPADM

## 2024-06-12 RX ORDER — ENOXAPARIN SODIUM 100 MG/ML
40 INJECTION SUBCUTANEOUS DAILY
Status: DISCONTINUED | OUTPATIENT
Start: 2024-06-12 | End: 2024-06-14 | Stop reason: HOSPADM

## 2024-06-12 RX ORDER — SENNOSIDES A AND B 8.6 MG/1
1 TABLET, FILM COATED ORAL DAILY PRN
Status: DISCONTINUED | OUTPATIENT
Start: 2024-06-12 | End: 2024-06-14 | Stop reason: HOSPADM

## 2024-06-12 RX ORDER — ACETAMINOPHEN 650 MG/1
650 SUPPOSITORY RECTAL EVERY 6 HOURS PRN
Status: DISCONTINUED | OUTPATIENT
Start: 2024-06-12 | End: 2024-06-14 | Stop reason: HOSPADM

## 2024-06-12 RX ORDER — PANTOPRAZOLE SODIUM 40 MG/1
40 TABLET, DELAYED RELEASE ORAL
Status: DISCONTINUED | OUTPATIENT
Start: 2024-06-13 | End: 2024-06-14 | Stop reason: HOSPADM

## 2024-06-12 RX ORDER — SODIUM CHLORIDE 9 MG/ML
INJECTION, SOLUTION INTRAVENOUS PRN
Status: DISCONTINUED | OUTPATIENT
Start: 2024-06-12 | End: 2024-06-14 | Stop reason: HOSPADM

## 2024-06-12 RX ORDER — POTASSIUM CHLORIDE 7.45 MG/ML
10 INJECTION INTRAVENOUS PRN
Status: DISCONTINUED | OUTPATIENT
Start: 2024-06-12 | End: 2024-06-14 | Stop reason: HOSPADM

## 2024-06-12 RX ORDER — ISOSORBIDE MONONITRATE 30 MG/1
30 TABLET, EXTENDED RELEASE ORAL DAILY
COMMUNITY

## 2024-06-12 RX ORDER — ISOSORBIDE MONONITRATE 30 MG/1
30 TABLET, EXTENDED RELEASE ORAL DAILY
Status: DISCONTINUED | OUTPATIENT
Start: 2024-06-12 | End: 2024-06-14 | Stop reason: HOSPADM

## 2024-06-12 RX ORDER — ONDANSETRON 4 MG/1
4 TABLET, ORALLY DISINTEGRATING ORAL EVERY 8 HOURS PRN
Status: DISCONTINUED | OUTPATIENT
Start: 2024-06-12 | End: 2024-06-14 | Stop reason: HOSPADM

## 2024-06-12 RX ORDER — SODIUM CHLORIDE 0.9 % (FLUSH) 0.9 %
10 SYRINGE (ML) INJECTION EVERY 12 HOURS SCHEDULED
Status: DISCONTINUED | OUTPATIENT
Start: 2024-06-12 | End: 2024-06-14 | Stop reason: HOSPADM

## 2024-06-12 RX ADMIN — ISOSORBIDE MONONITRATE 30 MG: 30 TABLET, EXTENDED RELEASE ORAL at 17:08

## 2024-06-12 RX ADMIN — ENOXAPARIN SODIUM 40 MG: 100 INJECTION SUBCUTANEOUS at 17:08

## 2024-06-12 RX ADMIN — AMLODIPINE BESYLATE 10 MG: 10 TABLET ORAL at 17:08

## 2024-06-12 ASSESSMENT — LIFESTYLE VARIABLES
HOW MANY STANDARD DRINKS CONTAINING ALCOHOL DO YOU HAVE ON A TYPICAL DAY: PATIENT DOES NOT DRINK
HOW OFTEN DO YOU HAVE A DRINK CONTAINING ALCOHOL: NEVER
HOW MANY STANDARD DRINKS CONTAINING ALCOHOL DO YOU HAVE ON A TYPICAL DAY: PATIENT DOES NOT DRINK

## 2024-06-12 ASSESSMENT — PAIN - FUNCTIONAL ASSESSMENT: PAIN_FUNCTIONAL_ASSESSMENT: NONE - DENIES PAIN

## 2024-06-12 NOTE — CONSULTS
[facility] CARDIOLOGY  CARDIAC ELECTROPHYSIOLOGY DEPARTMENT/DIVISION OF CARDIOLOGY  Inpatient consultation Report  PATIENT: Shante Ragsdale  MEDICAL RECORD NUMBER: [id number]  DATE OF SERVICE:  6/12/2024  ATTENDING ELECTROPHYSIOLOGIST:  Zana Lin DO   REFERRING PHYSICIAN: No ref. provider found and Taisha Taylor DO  CHIEF COMPLAINT: Syncope    HPI: Shante Ragsdale is a 84 y.o. female with a history of RBBB, moderate AI, HTN, spinal stenosis, and GERD.  She is managed by [facility] cardiology with amlodipine 10 mg daily, Imdur 30 mg daily, and PPI.  In 2023 or earlier, patient was diagnosed with RBBB and moderate AI.  On 6/12/2024, patient presented with chief complaint of syncope.  Patient has no memory of the event.  EMS reportedly was contacted by [facility] after patient had syncopal event at [facility].  No additional details are available.  There were reports of possible AV block, but no rhythm strips are available.  EP service was consulted for further evaluation/management.  Patient denies any other complaints at this time.  Family is present in the room.  Patient reportedly lives alone.    Prior cardiac testing:  - TTE (11/17/2023 at [facility]): LVEF = 55%, mild LVH, mild AS, moderate AI, mild MR, mild TR.    No past medical history on file.  Past Surgical History:   Procedure Laterality Date    APPENDECTOMY      BACK SURGERY      CERVICAL FUSION      CHOLECYSTECTOMY      HERNIA REPAIR      HYSTERECTOMY (CERVIX STATUS UNKNOWN)      JOINT REPLACEMENT      TONSILLECTOMY        No family history on file.  There is no family history of sudden cardiac arrest    Social History     Tobacco Use    Smoking status: Never    Smokeless tobacco: Never   Substance Use Topics    Alcohol use: No       Current Facility-Administered Medications   Medication Dose Route Frequency Provider Last Rate Last Admin    ondansetron (ZOFRAN) injection 4 mg  4 mg IntraVENous Once Suellen Roberts MD

## 2024-06-12 NOTE — PROGRESS NOTES
Pt's earrings removed and placed in an envelope.  Envelope given to family member.  Necklace left on, broken clasp and fear of breaking going over collar.

## 2024-06-12 NOTE — ED PROVIDER NOTES
tissue prominence is seen in the right   posterior parietal region. A much smaller, similar finding is noted in the   left posterior parietal region. If the patient sustained trauma, then this   would be consistent with areas of cephalohematoma. Further evaluation needs   to be based on clinical grounds.         CT CERVICAL SPINE WO CONTRAST   Final Result   No acute abnormality of the cervical spine.         XR CHEST PORTABLE   Final Result   1. No acute cardiopulmonary disease.   2. 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           No results found.    No results found.    PROCEDURES   Unless otherwise noted below, none          CRITICAL CARE TIME (.cct)       PAST MEDICAL HISTORY/Chronic Conditions Affecting Care      has no past medical history on file.     EMERGENCY DEPARTMENT COURSE    Vitals:    Vitals:    06/12/24 1836 06/12/24 1900 06/12/24 1930 06/12/24 2030   BP: (!) 147/72 125/67 (!) 119/56 115/61   Pulse: 88 91 91 82   Resp: 22 16 17 17   Temp:       SpO2: 95% 98% 96% 95%   Weight:       Height:           Patient was given the following medications:  Medications   ondansetron (ZOFRAN) injection 4 mg (4 mg IntraVENous Not Given 6/12/24 1655)   sodium chloride flush 0.9 % injection 10 mL (has no administration in time range)   sodium chloride flush 0.9 % injection 10 mL (has no administration in time range)   0.9 % sodium chloride infusion (has no administration in time range)   potassium chloride (KLOR-CON M) extended release tablet 40 mEq (has no administration in time range)     Or   potassium bicarb-citric acid (EFFER-K) effervescent tablet 40 mEq (has no administration in time range)     Or   potassium chloride 10 mEq/100 mL IVPB (Peripheral Line) (has no administration in time range)   enoxaparin (LOVENOX) injection 40 mg (40 mg SubCUTAneous Given 6/12/24 1708)   ondansetron (ZOFRAN-ODT) disintegrating tablet 4 mg (has no administration in time range)     Or   ondansetron  She endorses mild nausea but is otherwise asymptomatic.  She is alert and oriented x 3.  Vitals are unremarkable.  She is afebrile.  Exam shows heart regular rate and rhythm, lungs clear to auscultation bilaterally, abdomen soft and nontender.  Differentials include but not limited to arrhythmia, electrolyte abnormalities, intracranial hemorrhage, C-spine abnormality.  Pads were placed on the patient.  CBC is unremarkable.  CMP shows very slight decrease of bicarb to 21 otherwise unremarkable.  Glucose is 134.  Magnesium is 2.2.  Troponin is 24.  Repeat is pending at this time.  EKG shows normal sinus rhythm with right bundle branch block that is stable with prior.  Chest x-ray shows no acute cardiopulmonary process.  CT head shows no acute intracranial abnormality but she does have microvascular white matter ischemic changes and bilateral lacunar infarcts involving the basal ganglia that are probably old.  She does also have hematoma.  CT C-spine shows no acute abnormality of the C-spine.  X-ray of right hip shows no fractures.  On reexamination she is resting comfortably in no acute distress.  It does not appear that she is converted into any sort of arrhythmia or block while in the ED.  I discussed plan for admission for further evaluation of syncopal episode and heart rhythm and she and her family are agreeable.  The case was discussed with Dr. Valente who agrees to accept the patient for admission.  He requested consult with EP.  I discussed the case with Dr. Lin who agrees to see the patient in the hospital.      CONSULTS: (Who and What was discussed)  IP CONSULT TO INTERNAL MEDICINE  IP CONSULT TO ELECTROPHYSIOLOGY  IP CONSULT TO CARDIOLOGY      I am the Primary Clinician of Record.    FINAL IMPRESSION      1. Syncope and collapse          DISPOSITION/PLAN     DISPOSITION Admitted 06/12/2024 04:45:41 PM      PATIENT REFERRED TO:  No follow-up provider specified.    DISCHARGE MEDICATIONS:  New Prescriptions

## 2024-06-13 ENCOUNTER — APPOINTMENT (OUTPATIENT)
Age: 84
End: 2024-06-13
Attending: INTERNAL MEDICINE
Payer: MEDICARE

## 2024-06-13 LAB
ALBUMIN SERPL-MCNC: 4.2 G/DL (ref 3.5–5.2)
ALP SERPL-CCNC: 80 U/L (ref 35–104)
ALT SERPL-CCNC: 16 U/L (ref 0–32)
ANION GAP SERPL CALCULATED.3IONS-SCNC: 12 MMOL/L (ref 7–16)
AST SERPL-CCNC: 20 U/L (ref 0–31)
BASOPHILS # BLD: 0.03 K/UL (ref 0–0.2)
BASOPHILS NFR BLD: 1 % (ref 0–2)
BILIRUB SERPL-MCNC: 0.3 MG/DL (ref 0–1.2)
BUN SERPL-MCNC: 16 MG/DL (ref 6–23)
CALCIUM SERPL-MCNC: 9.6 MG/DL (ref 8.6–10.2)
CHLORIDE SERPL-SCNC: 103 MMOL/L (ref 98–107)
CO2 SERPL-SCNC: 26 MMOL/L (ref 22–29)
CREAT SERPL-MCNC: 0.7 MG/DL (ref 0.5–1)
ECHO AO ASC DIAM: 2.6 CM
ECHO AO ASCENDING AORTA INDEX: 1.87 CM/M2
ECHO AR MAX VEL PISA: 3.3 M/S
ECHO AV AREA PEAK VELOCITY: 1.9 CM2
ECHO AV AREA VTI: 2.1 CM2
ECHO AV AREA/BSA PEAK VELOCITY: 1.4 CM2/M2
ECHO AV AREA/BSA VTI: 1.5 CM2/M2
ECHO AV CUSP MM: 1.6 CM
ECHO AV MEAN GRADIENT: 8 MMHG
ECHO AV MEAN VELOCITY: 1.3 M/S
ECHO AV PEAK GRADIENT: 16 MMHG
ECHO AV PEAK VELOCITY: 2 M/S
ECHO AV REGURGITANT PHT: 400.1 MILLISECOND
ECHO AV VELOCITY RATIO: 0.65
ECHO AV VTI: 33.7 CM
ECHO BSA: 1.43 M2
ECHO LA DIAMETER INDEX: 2.95 CM/M2
ECHO LA DIAMETER: 4.1 CM
ECHO LA VOL A-L A2C: 27 ML (ref 22–52)
ECHO LA VOL A-L A4C: 41 ML (ref 22–52)
ECHO LA VOL MOD A2C: 25 ML (ref 22–52)
ECHO LA VOL MOD A4C: 38 ML (ref 22–52)
ECHO LA VOLUME AREA LENGTH: 34 ML
ECHO LA VOLUME INDEX A-L A2C: 19 ML/M2 (ref 16–34)
ECHO LA VOLUME INDEX A-L A4C: 29 ML/M2 (ref 16–34)
ECHO LA VOLUME INDEX AREA LENGTH: 24 ML/M2 (ref 16–34)
ECHO LA VOLUME INDEX MOD A2C: 18 ML/M2 (ref 16–34)
ECHO LA VOLUME INDEX MOD A4C: 27 ML/M2 (ref 16–34)
ECHO LV FRACTIONAL SHORTENING: 44 % (ref 28–44)
ECHO LV INTERNAL DIMENSION DIASTOLE INDEX: 2.45 CM/M2
ECHO LV INTERNAL DIMENSION DIASTOLIC: 3.4 CM (ref 3.9–5.3)
ECHO LV INTERNAL DIMENSION SYSTOLIC INDEX: 1.37 CM/M2
ECHO LV INTERNAL DIMENSION SYSTOLIC: 1.9 CM
ECHO LV IVSD: 0.8 CM (ref 0.6–0.9)
ECHO LV IVSS: 1 CM
ECHO LV MASS 2D: 106.3 G (ref 67–162)
ECHO LV MASS INDEX 2D: 76.5 G/M2 (ref 43–95)
ECHO LV POSTERIOR WALL DIASTOLIC: 1.3 CM (ref 0.6–0.9)
ECHO LV POSTERIOR WALL SYSTOLIC: 1.7 CM
ECHO LV RELATIVE WALL THICKNESS RATIO: 0.76
ECHO LVOT AREA: 2.8 CM2
ECHO LVOT AV VTI INDEX: 0.72
ECHO LVOT DIAM: 1.9 CM
ECHO LVOT MEAN GRADIENT: 4 MMHG
ECHO LVOT PEAK GRADIENT: 6 MMHG
ECHO LVOT PEAK VELOCITY: 1.3 M/S
ECHO LVOT STROKE VOLUME INDEX: 49.5 ML/M2
ECHO LVOT SV: 68.9 ML
ECHO LVOT VTI: 24.3 CM
ECHO MV A VELOCITY: 0.86 M/S
ECHO MV AREA PHT: 3.1 CM2
ECHO MV AREA VTI: 2.2 CM2
ECHO MV E DECELERATION TIME (DT): 216.3 MS
ECHO MV E VELOCITY: 0.38 M/S
ECHO MV E/A RATIO: 0.44
ECHO MV LVOT VTI INDEX: 1.3
ECHO MV MAX VELOCITY: 1.1 M/S
ECHO MV MEAN GRADIENT: 2 MMHG
ECHO MV MEAN VELOCITY: 0.6 M/S
ECHO MV PEAK GRADIENT: 5 MMHG
ECHO MV PRESSURE HALF TIME (PHT): 71.3 MS
ECHO MV VTI: 31.5 CM
ECHO PULMONARY ARTERY END DIASTOLIC PRESSURE: 4 MMHG
ECHO PV MAX VELOCITY: 1 M/S
ECHO PV MEAN GRADIENT: 2 MMHG
ECHO PV MEAN VELOCITY: 0.6 M/S
ECHO PV PEAK GRADIENT: 4 MMHG
ECHO PV REGURGITANT MAX VELOCITY: 1 M/S
ECHO PV VTI: 15.4 CM
ECHO PVEIN A DURATION: 129.2 MS
ECHO PVEIN A VELOCITY: 0.3 M/S
ECHO PVEIN PEAK D VELOCITY: 0.4 M/S
ECHO PVEIN PEAK S VELOCITY: 0.5 M/S
ECHO PVEIN S/D RATIO: 1.3
ECHO RV INTERNAL DIMENSION: 3.6 CM
ECHO RVOT MEAN GRADIENT: 1 MMHG
ECHO RVOT PEAK GRADIENT: 2 MMHG
ECHO RVOT PEAK VELOCITY: 0.8 M/S
ECHO RVOT VTI: 12.7 CM
ECHO TV REGURGITANT MAX VELOCITY: 2.03 M/S
ECHO TV REGURGITANT PEAK GRADIENT: 17 MMHG
EOSINOPHIL # BLD: 0.03 K/UL (ref 0.05–0.5)
EOSINOPHILS RELATIVE PERCENT: 1 % (ref 0–6)
ERYTHROCYTE [DISTWIDTH] IN BLOOD BY AUTOMATED COUNT: 14.6 % (ref 11.5–15)
GFR, ESTIMATED: 83 ML/MIN/1.73M2
GLUCOSE SERPL-MCNC: 96 MG/DL (ref 74–99)
HCT VFR BLD AUTO: 38.9 % (ref 34–48)
HGB BLD-MCNC: 12.5 G/DL (ref 11.5–15.5)
IMM GRANULOCYTES # BLD AUTO: <0.03 K/UL (ref 0–0.58)
IMM GRANULOCYTES NFR BLD: 0 % (ref 0–5)
LYMPHOCYTES NFR BLD: 1.39 K/UL (ref 1.5–4)
LYMPHOCYTES RELATIVE PERCENT: 23 % (ref 20–42)
MCH RBC QN AUTO: 27.9 PG (ref 26–35)
MCHC RBC AUTO-ENTMCNC: 32.1 G/DL (ref 32–34.5)
MCV RBC AUTO: 86.8 FL (ref 80–99.9)
MONOCYTES NFR BLD: 0.53 K/UL (ref 0.1–0.95)
MONOCYTES NFR BLD: 9 % (ref 2–12)
NEUTROPHILS NFR BLD: 66 % (ref 43–80)
NEUTS SEG NFR BLD: 3.94 K/UL (ref 1.8–7.3)
PLATELET # BLD AUTO: 334 K/UL (ref 130–450)
PMV BLD AUTO: 9.9 FL (ref 7–12)
POTASSIUM SERPL-SCNC: 3.9 MMOL/L (ref 3.5–5)
PROT SERPL-MCNC: 7.3 G/DL (ref 6.4–8.3)
RBC # BLD AUTO: 4.48 M/UL (ref 3.5–5.5)
SODIUM SERPL-SCNC: 141 MMOL/L (ref 132–146)
WBC OTHER # BLD: 5.9 K/UL (ref 4.5–11.5)

## 2024-06-13 PROCEDURE — 85025 COMPLETE CBC W/AUTO DIFF WBC: CPT

## 2024-06-13 PROCEDURE — 97165 OT EVAL LOW COMPLEX 30 MIN: CPT

## 2024-06-13 PROCEDURE — 97535 SELF CARE MNGMENT TRAINING: CPT

## 2024-06-13 PROCEDURE — 93306 TTE W/DOPPLER COMPLETE: CPT

## 2024-06-13 PROCEDURE — 2580000003 HC RX 258: Performed by: STUDENT IN AN ORGANIZED HEALTH CARE EDUCATION/TRAINING PROGRAM

## 2024-06-13 PROCEDURE — 6360000002 HC RX W HCPCS: Performed by: STUDENT IN AN ORGANIZED HEALTH CARE EDUCATION/TRAINING PROGRAM

## 2024-06-13 PROCEDURE — 97530 THERAPEUTIC ACTIVITIES: CPT

## 2024-06-13 PROCEDURE — 99233 SBSQ HOSP IP/OBS HIGH 50: CPT | Performed by: STUDENT IN AN ORGANIZED HEALTH CARE EDUCATION/TRAINING PROGRAM

## 2024-06-13 PROCEDURE — 97161 PT EVAL LOW COMPLEX 20 MIN: CPT

## 2024-06-13 PROCEDURE — 6370000000 HC RX 637 (ALT 250 FOR IP): Performed by: STUDENT IN AN ORGANIZED HEALTH CARE EDUCATION/TRAINING PROGRAM

## 2024-06-13 PROCEDURE — 80053 COMPREHEN METABOLIC PANEL: CPT

## 2024-06-13 PROCEDURE — 2580000003 HC RX 258: Performed by: INTERNAL MEDICINE

## 2024-06-13 PROCEDURE — 36415 COLL VENOUS BLD VENIPUNCTURE: CPT

## 2024-06-13 PROCEDURE — 2060000000 HC ICU INTERMEDIATE R&B

## 2024-06-13 RX ORDER — SODIUM CHLORIDE 9 MG/ML
INJECTION, SOLUTION INTRAVENOUS CONTINUOUS
Status: DISCONTINUED | OUTPATIENT
Start: 2024-06-13 | End: 2024-06-14 | Stop reason: HOSPADM

## 2024-06-13 RX ADMIN — ISOSORBIDE MONONITRATE 30 MG: 30 TABLET, EXTENDED RELEASE ORAL at 07:59

## 2024-06-13 RX ADMIN — SODIUM CHLORIDE, PRESERVATIVE FREE 10 ML: 5 INJECTION INTRAVENOUS at 14:23

## 2024-06-13 RX ADMIN — SODIUM CHLORIDE, PRESERVATIVE FREE 10 ML: 5 INJECTION INTRAVENOUS at 20:52

## 2024-06-13 RX ADMIN — PANTOPRAZOLE SODIUM 40 MG: 40 TABLET, DELAYED RELEASE ORAL at 07:59

## 2024-06-13 RX ADMIN — AMLODIPINE BESYLATE 10 MG: 10 TABLET ORAL at 07:59

## 2024-06-13 RX ADMIN — ENOXAPARIN SODIUM 40 MG: 100 INJECTION SUBCUTANEOUS at 07:58

## 2024-06-13 RX ADMIN — SODIUM CHLORIDE: 9 INJECTION, SOLUTION INTRAVENOUS at 14:19

## 2024-06-13 NOTE — FLOWSHEET NOTE
06/13/24 1815   Vital Signs   Blood Pressure Lying 129/63   Pulse Lying 80 PER MINUTE   Blood Pressure Sitting 133/62   Pulse Sitting 83 PER MINUTE   Blood Pressure Standing 129/63   Pulse Standing 85 PER MINUTE

## 2024-06-13 NOTE — PROGRESS NOTES
functional transfers with focus on safety, body mechanics, and precautions. At end of session, patient seated in chair with family present, properly positioned, oriented to call light, with call light  and phone within reach, all lines and tubes intact. Nursing notified.  Overall patient demonstrated good reception to education, decreased independence and safety during completion of ADL/functional transfer/mobility tasks.  Pt would benefit from continued skilled OT to increase safety and independence with completion of ADL/IADL tasks for functional independence and quality of life.    Treatment: OT treatment provided this date includes:   Instruction/training on safety and adapted techniques for completion of ADLs: to increase independence in self-care.   Instruction/training on safe functional mobility/transfer techniques: with focus on safety, body mechanics, and precautions   Instruction/training on energy conservation/work simplification for completion of ADLs: techniques to increase independence with self-care ADLs and IADLs, work simplification to improve endurance.   Proper Positioning/Alignment: for optimal healing, skin integrity, to prevent breakdown, decrease edema, and reduce risk of contracture.  Skilled Monitoring of Vitals: to include BP, spO2, and HR throughout session to maximize safety.  Sitting/Standing Balance/Tolerance: to increase balance and activity tolerance during ADLs and facilitate proper posture and positioning    Rehab Potential: Good for established goals     Patient / Family Goal: to return to PLOF      Patient and/or family were instructed on functional diagnosis, prognosis/goals and OT plan of care. Demonstrated good understanding.     Eval Complexity: Low    Time In: 1410  Time Out: 1450  Total Treatment Time: 25 min    Min Units   OT Eval Low 45758 X      OT Eval Medium 00421      OT Eval High 65785      OT Re-Eval 17863       Therapeutic Ex 03335       Therapeutic Activities  18928 10  1    ADL/Self Care 99119 15  1    Orthotic Management 54392       Manual 37404     Neuro Re-Ed 12301       Non-Billable Time          Evaluation Time additionally includes thorough review of current medical information, gathering information on past medical history/social history and prior level of function, interpretation of standardized testing/informal observation of tasks, assessment of data and development of plan of care and goals.            Marla Fabian, OTD,  OTR/L; [id number]

## 2024-06-13 NOTE — PROGRESS NOTES
Physical Therapy      Physical Therapy Initial Assessment     Name: Shante Ragsdale  : 1940  MRN: [id number]      Date of Service: 2024    Evaluating PT:  Tom Cobb, PT, DPT  [id number]     Room #:  8509/8509-A  Diagnosis:  Syncope and collapse [R55]  LOPES (dyspnea on exertion) [R06.09]  PMHx/PSHx:   has no past medical history on file.   Procedure/Surgery:  none  Precautions:  Falls  Equipment Needs:  TBD    SUBJECTIVE:    Pt lives alone in a 2 story home with 2 small steps to enter.  Pt states she only goes upstairs every couple of weeks.  She does go to the basement for laundry.  Pt has full flights between levels with B handrails.  Pt ambulated with no AD and independent PTA.  She does report using her SPC when she goes for a walk around the block in order to fend off any stray animals that may give her trouble.      OBJECTIVE:   Initial Evaluation  Date: 24 Treatment Short Term/ Long Term   Goals   AM-PAC 6 Clicks      Was pt agreeable to Eval/treatment? Yes      Does pt have pain? No c/o pain     Bed Mobility  Rolling: NT  Supine to sit: Min A  Sit to supine: NT  Scooting: SBA  Rolling: Independent   Supine to sit: Independent   Sit to supine: Independent   Scooting: Independent    Transfers Sit to stand: Min A  Stand to sit: Min A  Stand pivot: Min A with no AD  Sit to stand: Independent   Stand to sit: Independent   Stand pivot: Modified Independent with AAD if needed   Ambulation    25 feet x2 with HHA Min A  >350 feet with AAD if needed Modified Independent     Stair negotiation: ascended and descended  NT  >4 steps with 1 rail Modified Independent     ROM BUE:  Per OT eval  BLE:  WFL     Strength BUE:  Per OT eval   BLE:  WFL     Balance Sitting EOB:  SBA  Dynamic Standing:  Min A with HHA  Sitting EOB:  Independent   Dynamic Standing:  Modified Independent with AAD if needed      Pt is A & O x 4  Sensation:  Pt denies numbness and tingling to extremities  Edema:   minutes  [] Manual therapy 37450 -- minutes  [x] Therapeutic activities 87204 24 minutes  [] Therapeutic exercises 37671 - minutes  [] Neuromuscular reeducation 86449 -- minutes     Tom Cobb, PT, DPT  [id number]

## 2024-06-13 NOTE — H&P
Internal Medicine History & Physical     Name: Shante Ragsdale  : 1940  Chief Complaint: Fall (Walking then fell to floor, 3rd degree heart block, self corrected, call was for unresponsive, coming back around now. Unknown thinners)  Primary Care Physician: Taisha Taylor DO  Admission date: 2024  Date of service: 2024     History of Present Illness  Shante BOSTON is a 84 y.o. year old female who presented with a chief complaint of LOC     Very nice woman, in bed in [facility] ED, on room air, states she is here for LOC. She tells me she had an episode of LOC in April that she tells me that she has not told any one about. It was 1 week before her birthday on . She did not tell her doctors. On  she was ready to go to [employer] and she got real \"foggy\" and she thinks she passed out. She does not remember the details very well. She does not take any blood thinners. She has HTN, HLD, she has been seen by cardiology in the past.     In the EMS they had reported an EKG with potential heart block, spoke with Dr Campos who reviewed and stated he thinks it could have been complete heart block    Call placed to EP for consultation and evaluation which will be appreciated     She uses a cane/walker, she can walk around the block, she tells me she only uses the cane so she can clobber any dogs that come after her.     She is a past smoker quit 40 years ago, smoked for 10-15 years about 1/2 pack per day    She lives home alone currently, she still cuts her own grass     Her  passed away 3 years ago    She denies chest pain or shortness of breath at this time     No past medical history on file.    Past Surgical History:   Procedure Laterality Date    APPENDECTOMY      BACK SURGERY      CERVICAL FUSION      CHOLECYSTECTOMY      HERNIA REPAIR      HYSTERECTOMY (CERVIX STATUS UNKNOWN)      JOINT REPLACEMENT      TONSILLECTOMY         No family history on file.      Social History  Patient lives at home.

## 2024-06-13 NOTE — PROGRESS NOTES
4 Eyes Skin Assessment     NAME:  Shante Ragsdale  YOB: 1940  MEDICAL RECORD NUMBER:  [id number]    The patient is being assessed for  Admission    I agree that at least one RN has performed a thorough Head to Toe Skin Assessment on the patient. ALL assessment sites listed below have been assessed.      Areas assessed by both nurses:    Head, Face, Ears, Shoulders, Back, Chest, Arms, Elbows, Hands, Sacrum. Buttock, Coccyx, Ischium, Legs. Feet and Heels, and Under Medical Devices         Does the Patient have a Wound? No noted wound(s)       Sheldon Prevention initiated by RN: No  Wound Care Orders initiated by RN: No    Pressure Injury (Stage 3,4, Unstageable, DTI, NWPT, and Complex wounds) if present, place Wound referral order by RN under : No    New Ostomies, if present place, Ostomy referral order under : No     Nurse 1 eSignature: Electronically signed by Osbaldo Dean RN on 6/13/24 at 3:47 PM EDT    **SHARE this note so that the co-signing nurse can place an eSignature**    Nurse 2 eSignature: Electronically signed by Anthony Song RN on 6/13/24 at 3:48 PM EDT

## 2024-06-13 NOTE — PROGRESS NOTES
[facility] CARDIOLOGY  CARDIAC ELECTROPHYSIOLOGY DEPARTMENT/DIVISION OF CARDIOLOGY  Inpatient progress report  PATIENT: Shante Ragsdale  MEDICAL RECORD NUMBER: [id number]  DATE OF SERVICE:  6/13/2024  ATTENDING ELECTROPHYSIOLOGIST:  Zana Lin DO   REFERRING PHYSICIAN: No ref. provider found and Taisha Taylor DO  CHIEF COMPLAINT: Syncope    HPI: Shante Ragsdale is a 84 y.o. female with a history of RBBB, moderate AI, HTN, spinal stenosis, and GERD.  She is managed by [facility] cardiology with amlodipine 10 mg daily, Imdur 30 mg daily, and PPI.  In 2023 or earlier, patient was diagnosed with RBBB and moderate AI.  On 6/12/2024, patient presented with chief complaint of syncope.  Patient has no memory of the event.  EMS reportedly was contacted by [facility] after patient had syncopal event at [facility].  No additional details are available.  There were reports of possible AV block, but no rhythm strips are available.  EP service was consulted for further evaluation/management.  Patient denies any other complaints at this time.  Family is present in the room.  Patient reportedly lives alone.    6/13/2024: TTE today grossly normal.  Unfortunately, she was monitor on telemetry until this afternoon.  She denies any symptoms.  I attempted to contact the EMS service that brought her to the hospital (Karen DOAN), but no answer despite multiple calls and voicemail.    Prior cardiac testing:  - TTE (6/13/2024): LVEF = 55-60%, mild AI, mild MR, mild TR.  - TTE (11/17/2023 at [facility]): LVEF = 55%, mild LVH, mild AS, moderate AI, mild MR, mild TR.    No past medical history on file.  Past Surgical History:   Procedure Laterality Date    APPENDECTOMY      BACK SURGERY      CERVICAL FUSION      CHOLECYSTECTOMY      HERNIA REPAIR      HYSTERECTOMY (CERVIX STATUS UNKNOWN)      JOINT REPLACEMENT      TONSILLECTOMY        No family history on file.  There is no family history of sudden cardiac

## 2024-06-13 NOTE — PLAN OF CARE
Problem: Discharge Planning  Goal: Discharge to home or other facility with appropriate resources  Outcome: Progressing  Flowsheets (Taken 6/13/2024 0738)  Discharge to home or other facility with appropriate resources:   Identify barriers to discharge with patient and caregiver   Identify discharge learning needs (meds, wound care, etc)     Problem: Safety - Adult  Goal: Free from fall injury  Outcome: Progressing     Problem: ABCDS Injury Assessment  Goal: Absence of physical injury  Outcome: Progressing

## 2024-06-13 NOTE — CONSULTS
[facility] cardiology/[facility]    INPATIENT CARDIOLOGY CONSULT    Name: Shante Ragsdale    Age: 84 y.o.    Date of Admission: 6/12/2024  1:06 PM    Date of Service: 6/13/2024    Reason for Consultation: Passed out on the commode preceded by seeing spots and lightheaded    Referring Physician:     History of Present Illness: The patient is a 84 y.o. year old female with a known history of right bundle branch block, hypertension, echocardiogram August 2023 LVEF normal moderate AI mild aortic valve stenosis mild MR and TR    She reports she was on the commode 3 days prior to admission and had the onset of some lightheadedness and dizziness.  She ended up ending up somewhat waking up on the commode there was no reported fall trauma.  She noticed her eyeglasses had fallen to the ground.  She denies any recent significant nausea vomiting or diarrhea.  No indication of clear-cut dehydration.    She is active at home without exertional limitation.  She drives on a regular basis.  Reports occasional lightheadedness.  No indication of prior syncope, stroke symptoms or falls.  Echocardiogram completed today shows normal LVEF 55 to 60%, mild AI/MR/TR.    Past Medical History: As above history of hypertension    Past surgical history: Appendectomy, back surgery cholecystectomy hysterectomy joint replacement tonsillectomy.    Social history: Lives independently.  No significant alcohol consumption occasional alcohol drink social setting, no smoking.      Review of Systems:     Constitutional: No fever, chills, sweats  Cardiac: As per HPI  Pulmonary: No cough, wheeze, hemoptysis  HEENT: No visual disturbances or difficult swallowing  GI: No nausea, vomiting, diarrhea, abdominal pain, rectal bleeding  : No dysuria or hematuria  Endocrine: No excessive thirst, heat or cold intolerance.   Musculoskeletal: No joint pain or muscle aches. No claudication  Skin: No skin breakdown or rashes  Neuro: No headache,  (Oral)   Resp 19   Ht 1.397 m (4' 7\")   Wt 52.6 kg (116 lb)   SpO2 95%   BMI 26.96 kg/m²   Weight change:   Wt Readings from Last 3 Encounters:   06/12/24 52.6 kg (116 lb)   11/06/23 52.6 kg (116 lb)   10/06/23 53.5 kg (118 lb)         General: Awake, alert, oriented x3, no acute distress  HEENT: Unremarkable  Neck: No JVD or bruits.  Cardiac: Regular rate and rhythm, normal S1 and S2, no extra heart sounds, murmurs, heaves, thrills  Resp: Lungs clear without wheezing or crackles. No accessory muscle use or retraction  Abdomen: soft, nontender, nondistended, no gross organomegaly or mass  Skin: Warm and dry, no cyanosis.  Musculoskeletal: normal tone and strength in the upper and lower extremities bilaterally  Neuro: Grossly unremarkable  Psych: Cooperative, and normal affect    Intake/Output:    Intake/Output Summary (Last 24 hours) at 6/13/2024 1300  Last data filed at 6/13/2024 0757  Gross per 24 hour   Intake --   Output 600 ml   Net -600 ml     I/O this shift:  In: -   Out: 600 [Urine:600]    Laboratory Tests:  Lab Results   Component Value Date    CREATININE 0.7 06/13/2024    BUN 16 06/13/2024     06/13/2024    K 3.9 06/13/2024     06/13/2024    CO2 26 06/13/2024     No results for input(s): \"CKTOTAL\", \"CKMB\" in the last 72 hours.    Invalid input(s): \"TROPONONI\"  No results found for: \"BNP\"  Lab Results   Component Value Date/Time    WBC 5.9 06/13/2024 07:54 AM    RBC 4.48 06/13/2024 07:54 AM    HGB 12.5 06/13/2024 07:54 AM    HCT 38.9 06/13/2024 07:54 AM    MCV 86.8 06/13/2024 07:54 AM    MCH 27.9 06/13/2024 07:54 AM    MCHC 32.1 06/13/2024 07:54 AM    RDW 14.6 06/13/2024 07:54 AM     06/13/2024 07:54 AM    MPV 9.9 06/13/2024 07:54 AM     Recent Labs     06/12/24  1328 06/13/24  0754   ALKPHOS 73 80   ALT 20 16   AST 38* 20   BILITOT 0.2 0.3     Lab Results   Component Value Date/Time    MG 2.2 06/12/2024 04:08 PM     No results found for: \"PROTIME\", \"INR\"  Lab Results   Component

## 2024-06-14 ENCOUNTER — APPOINTMENT (OUTPATIENT)
Dept: CT IMAGING | Age: 84
End: 2024-06-14
Attending: STUDENT IN AN ORGANIZED HEALTH CARE EDUCATION/TRAINING PROGRAM
Payer: MEDICARE

## 2024-06-14 VITALS
TEMPERATURE: 97.9 F | BODY MASS INDEX: 26.85 KG/M2 | OXYGEN SATURATION: 96 % | RESPIRATION RATE: 20 BRPM | DIASTOLIC BLOOD PRESSURE: 63 MMHG | SYSTOLIC BLOOD PRESSURE: 133 MMHG | WEIGHT: 116 LBS | HEART RATE: 90 BPM | HEIGHT: 55 IN

## 2024-06-14 LAB
ALBUMIN SERPL-MCNC: 3.5 G/DL (ref 3.5–5.2)
ALP SERPL-CCNC: 67 U/L (ref 35–104)
ALT SERPL-CCNC: 12 U/L (ref 0–32)
ANION GAP SERPL CALCULATED.3IONS-SCNC: 15 MMOL/L (ref 7–16)
AST SERPL-CCNC: 16 U/L (ref 0–31)
BASOPHILS # BLD: 0.06 K/UL (ref 0–0.2)
BASOPHILS NFR BLD: 1 % (ref 0–2)
BILIRUB SERPL-MCNC: 0.3 MG/DL (ref 0–1.2)
BUN SERPL-MCNC: 15 MG/DL (ref 6–23)
CALCIUM SERPL-MCNC: 8.6 MG/DL (ref 8.6–10.2)
CHLORIDE SERPL-SCNC: 104 MMOL/L (ref 98–107)
CO2 SERPL-SCNC: 18 MMOL/L (ref 22–29)
CREAT SERPL-MCNC: 0.6 MG/DL (ref 0.5–1)
EOSINOPHIL # BLD: 0.05 K/UL (ref 0.05–0.5)
EOSINOPHILS RELATIVE PERCENT: 1 % (ref 0–6)
ERYTHROCYTE [DISTWIDTH] IN BLOOD BY AUTOMATED COUNT: 14.7 % (ref 11.5–15)
GFR, ESTIMATED: 87 ML/MIN/1.73M2
GLUCOSE SERPL-MCNC: 97 MG/DL (ref 74–99)
HCT VFR BLD AUTO: 37.4 % (ref 34–48)
HGB BLD-MCNC: 11.9 G/DL (ref 11.5–15.5)
IMM GRANULOCYTES # BLD AUTO: <0.03 K/UL (ref 0–0.58)
IMM GRANULOCYTES NFR BLD: 0 % (ref 0–5)
LYMPHOCYTES NFR BLD: 1.09 K/UL (ref 1.5–4)
LYMPHOCYTES RELATIVE PERCENT: 14 % (ref 20–42)
MCH RBC QN AUTO: 29 PG (ref 26–35)
MCHC RBC AUTO-ENTMCNC: 31.8 G/DL (ref 32–34.5)
MCV RBC AUTO: 91.2 FL (ref 80–99.9)
MONOCYTES NFR BLD: 0.79 K/UL (ref 0.1–0.95)
MONOCYTES NFR BLD: 10 % (ref 2–12)
NEUTROPHILS NFR BLD: 75 % (ref 43–80)
NEUTS SEG NFR BLD: 6.08 K/UL (ref 1.8–7.3)
PLATELET # BLD AUTO: 242 K/UL (ref 130–450)
PMV BLD AUTO: 10.2 FL (ref 7–12)
POTASSIUM SERPL-SCNC: 3.7 MMOL/L (ref 3.5–5)
PROT SERPL-MCNC: 6.5 G/DL (ref 6.4–8.3)
RBC # BLD AUTO: 4.1 M/UL (ref 3.5–5.5)
SODIUM SERPL-SCNC: 137 MMOL/L (ref 132–146)
WBC OTHER # BLD: 8.1 K/UL (ref 4.5–11.5)

## 2024-06-14 PROCEDURE — 36415 COLL VENOUS BLD VENIPUNCTURE: CPT

## 2024-06-14 PROCEDURE — 80053 COMPREHEN METABOLIC PANEL: CPT

## 2024-06-14 PROCEDURE — 97530 THERAPEUTIC ACTIVITIES: CPT

## 2024-06-14 PROCEDURE — 85025 COMPLETE CBC W/AUTO DIFF WBC: CPT

## 2024-06-14 PROCEDURE — 70450 CT HEAD/BRAIN W/O DYE: CPT

## 2024-06-14 PROCEDURE — 6370000000 HC RX 637 (ALT 250 FOR IP): Performed by: STUDENT IN AN ORGANIZED HEALTH CARE EDUCATION/TRAINING PROGRAM

## 2024-06-14 RX ADMIN — ISOSORBIDE MONONITRATE 30 MG: 30 TABLET, EXTENDED RELEASE ORAL at 10:06

## 2024-06-14 RX ADMIN — PANTOPRAZOLE SODIUM 40 MG: 40 TABLET, DELAYED RELEASE ORAL at 06:01

## 2024-06-14 RX ADMIN — AMLODIPINE BESYLATE 10 MG: 10 TABLET ORAL at 10:06

## 2024-06-14 NOTE — PROGRESS NOTES
[facility] CARDIOLOGY PROGRESS NOTE  [facility]        Subjective: Admitted after on the commode passed out.  No indication of exertional chest pain, no heart failure symptoms.    In April 2024 after lifting a table she became lightheaded and may have passed out at that time.    On Sunday 5 days ago on the commode, glasses ended up on the ground but she is not sure but did not tell her family until 3 days later.    Denies any discomfort currently or overnight.      Objective: Medications lab chart telemetry all reviewed.    Patient Vitals for the past 24 hrs:   BP Temp Temp src Pulse Resp SpO2   06/14/24 0806 133/63 97.9 °F (36.6 °C) Oral 90 20 96 %   06/13/24 2254 139/69 98.2 °F (36.8 °C) Tympanic 80 20 96 %   06/13/24 1900 (!) 117/54 97.4 °F (36.3 °C) Tympanic 80 20 96 %   06/13/24 1544 130/62 97.7 °F (36.5 °C) Temporal 80 20 96 %   06/13/24 1330 135/71 97.9 °F (36.6 °C) Oral 89 17 97 %       No intake or output data in the 24 hours ending 06/14/24 1133    Wt Readings from Last 3 Encounters:   06/12/24 52.6 kg (116 lb)   11/06/23 52.6 kg (116 lb)   10/06/23 53.5 kg (118 lb)       Telemetry: Personally interpreted and shows normal sinus rhythm no pauses or AV block.    Current meds: Scheduled Meds:   ondansetron  4 mg IntraVENous Once    sodium chloride flush  10 mL IntraVENous 2 times per day    enoxaparin  40 mg SubCUTAneous Daily    amLODIPine  10 mg Oral Daily    pantoprazole  40 mg Oral QAM AC    isosorbide mononitrate  30 mg Oral Daily     Continuous Infusions:   sodium chloride 100 mL/hr at 06/13/24 1419    sodium chloride       PRN Meds:.sodium chloride flush, sodium chloride, potassium chloride **OR** potassium alternative oral replacement **OR** potassium chloride, ondansetron **OR** ondansetron, senna, acetaminophen **OR** acetaminophen    Allergies: Percocet [oxycodone-acetaminophen], Prednisone, and Vicodin [hydrocodone-acetaminophen]      Labs:   Recent Labs     06/12/24  1328 06/13/24  0755

## 2024-06-14 NOTE — PROGRESS NOTES
Internal Medicine Progress Note    Patient's name: Shante Ragsdale  : 1940  Chief complaints (on day of admission): Fall (Walking then fell to floor, 3rd degree heart block, self corrected, call was for unresponsive, coming back around now. Unknown thinners)  Admission date: 2024  Date of service: 2024   Room: [address] IMCU/NEURO  Primary care physician: Taisha Taylor DO  Reason for visit: Follow-up for syncopal event     Subjective  Shante BOSTON was seen and examined at bedside     Will obtain a repeat CT scan of her head and ask neurology to evaluate imaging. There are also evidence of old strokes on the CT scan from the ED which need to be addressed. Her TSH was suppressed so will check free t4. Patient is doing very well awake alert oriented no issues she feels very well. Her ECHO came back great. There are plans for holter monitor upon DC     Review of Systems  There are no new complaints of chest pain, shortness of breath, abdominal pain, nausea, vomiting, diarrhea, constipation unless otherwise mentioned above.     Hospital Medications  Current Facility-Administered Medications   Medication Dose Route Frequency Provider Last Rate Last Admin    0.9 % sodium chloride infusion   IntraVENous Continuous Nino Ayala  mL/hr at 24 1419 New Bag at 24 1419    ondansetron (ZOFRAN) injection 4 mg  4 mg IntraVENous Once Suellen Roberts MD        sodium chloride flush 0.9 % injection 10 mL  10 mL IntraVENous 2 times per day Jason Valente MD   10 mL at 24    sodium chloride flush 0.9 % injection 10 mL  10 mL IntraVENous PRN Jsaon Valente MD        0.9 % sodium chloride infusion   IntraVENous PRN Jason Valente MD        potassium chloride (KLOR-CON M) extended release tablet 40 mEq  40 mEq Oral PRN Jason Valente MD        Or    potassium bicarb-citric acid (EFFER-K) effervescent tablet 40 mEq  40 mEq Oral PRN Jason Valente MD        Or    potassium chloride 10 mEq/100

## 2024-06-14 NOTE — PROGRESS NOTES
Physical Therapy  Physical Therapy Treatment    Name: Shante Ragsdale  : 1940  MRN: [id number]      Date of Service: 2024    Evaluating PT:  Tom Cobb, PT, DPT  [id number]     Room #:  8509/8509-A  Diagnosis:  Syncope and collapse [R55]  LOPES (dyspnea on exertion) [R06.09]  PMHx/PSHx:   has no past medical history on file.   has a past surgical history that includes Hysterectomy; back surgery; joint replacement; cervical fusion; Tonsillectomy; Appendectomy; Cholecystectomy; and hernia repair.  Procedure/Surgery:  none  Precautions:  Falls  Equipment Needs:  FWW; TBD    SUBJECTIVE:    Pt lives alone in a 2 story home with 2 small steps to enter.  Pt states she only goes upstairs every couple of weeks.  She does go to the basement for laundry.  Pt has full flights between levels with B handrails.  Pt ambulated with no AD and independent PTA.  She does report using her SPC when she goes for a walk around the block in order to fend off any stray animals that may give her trouble.    OBJECTIVE:   Initial Evaluation  Date: 24 Treatment  2024 Short Term/ Long Term   Goals   AM-PAC 6 Clicks     Was pt agreeable to Eval/treatment? Yes  Yes    Does pt have pain? No c/o pain R hip    Bed Mobility  Rolling: NT  Supine to sit: Min A  Sit to supine: NT  Scooting: SBA Rolling: NT  Supine to sit: NT  Sit to supine: NT  Scooting: NT Rolling: Independent   Supine to sit: Independent   Sit to supine: Independent   Scooting: Independent    Transfers Sit to stand: Min A  Stand to sit: Min A  Stand pivot: Min A with no AD Sit to stand: SBA  Stand to sit: SBA  Stand pivot: NT Sit to stand: Independent   Stand to sit: Independent   Stand pivot: Modified Independent with AAD if needed   Ambulation    25 feet x2 with HHA Min A 90 feet with HHA Vikas    330 feet with WW SBA >350 feet with AAD if needed Modified Independent     Stair negotiation: ascended and descended  NT 4 steps with 2 rails CGA    4 steps

## 2024-06-14 NOTE — PLAN OF CARE
Problem: Discharge Planning  Goal: Discharge to home or other facility with appropriate resources  6/14/2024 0304 by Tracee Sharp RN  Outcome: Progressing  6/13/2024 1720 by Osbaldo Dean RN  Outcome: Progressing  Flowsheets (Taken 6/13/2024 1407)  Discharge to home or other facility with appropriate resources:   Identify barriers to discharge with patient and caregiver   Identify discharge learning needs (meds, wound care, etc)     Problem: Safety - Adult  Goal: Free from fall injury  6/14/2024 0304 by Tracee Sharp RN  Outcome: Progressing  6/13/2024 1720 by Osbaldo Dean RN  Outcome: Progressing     Problem: ABCDS Injury Assessment  Goal: Absence of physical injury  6/14/2024 0304 by Tracee Sharp RN  Outcome: Progressing  6/13/2024 1720 by Osbaldo Dean RN  Outcome: Progressing

## 2024-06-14 NOTE — DISCHARGE SUMMARY
the mA/kV was utilized to reduce the radiation dose to as low as reasonably achievable. COMPARISON: None. HISTORY: ORDERING SYSTEM PROVIDED HISTORY: syncope, loc, head trauma TECHNOLOGIST PROVIDED HISTORY: Reason for exam:->syncope, loc, head trauma What reading provider will be dictating this exam?->CRC FINDINGS: BONES/ALIGNMENT: There is no acute fracture or traumatic malalignment. Anterolisthesis of C7 on T1 may be associated with degenerative changes. DEGENERATIVE CHANGES: Multilevel degenerative changes with ankylosing of the C6-C7 vertebral body levels. SOFT TISSUES: There is no prevertebral soft tissue swelling.     No acute abnormality of the cervical spine.     XR CHEST PORTABLE    Result Date: 6/12/2024  EXAMINATION: ONE XRAY VIEW OF THE CHEST 6/12/2024 2:18 pm COMPARISON: None. HISTORY: ORDERING SYSTEM PROVIDED HISTORY: syncope TECHNOLOGIST PROVIDED HISTORY: Reason for exam:->syncope FINDINGS: An external pacemaker lead is present. The lungs are clear, with no sign of any infiltrate or effusion. The heart is normal in size. The mediastinum is normal in appearance. There is mild primary osteoarthritis of both glenohumeral articulations. There is widening of the acromioclavicular joints consistent with resection of the distal clavicles from rotator cuff surgery.     1. No acute cardiopulmonary disease. 2. 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       MICROBIOLOGY:  BLOOD CX #1  No results for input(s): \"BC\" in the last 72 hours.  BLOOD CX #2  No results for input(s): \"BLOODCULT2\" in the last 72 hours.  TIP CULTURE  No results for input(s): \"CXCATHTIP\" in the last 72 hours.   CULTURE, RESPIRATORY   No results for input(s): \"CULTRESP\" in the last 72 hours.  RESPIRATORY SMEAR  No results for input(s): \"RESPSMEAR\" in the last 72 hours.       ECHO:      DISPOSITION:  The patient's condition is good.   The patient is being discharged to home    DISCHARGE MEDICATIONS:      Medication List         CONTINUE taking these medications      amLODIPine 10 MG tablet  Commonly known as: NORVASC  Take 1 tablet by mouth daily     esomeprazole 40 MG delayed release capsule  Commonly known as: NEXIUM  Take 1 capsule by mouth every morning (before breakfast)     isosorbide mononitrate 30 MG extended release tablet  Commonly known as: IMDUR              Discharge Medication List as of 6/14/2024  1:37 PM        Discharge Medication List as of 6/14/2024  1:37 PM        STOP taking these medications       rosuvastatin (CRESTOR) 20 MG tablet Comments:   Reason for Stopping:             Discharge Medication List as of 6/14/2024  1:37 PM          INTERNAL MEDICINE FOLLOW UP/INSTRUCTIONS:  Follow-up with primary care physician within 1 week of discharge from hospital  Please review changes to pre-hospital admission medications and prescriptions for new medications upon discharge from the hospital with PCP  Please review results of labs and imaging studies with PCP  Follow-up with consultants as directed by them   If recurrence or worsening of symptoms please call primary care physician or return to the ER immediately  Diet: ADULT DIET; Regular    Preparing for this patient's discharge, including paperwork, orders, instructions, and meeting with patient did required >35 minutes.    Electronically signed by Jason Valente MD on 6/14/2024 at 5:09 PM

## 2024-06-14 NOTE — CARE COORDINATION
Here after a fall (Walking then fell to floor, 3rd degree heart block, self corrected, call was for unresponsive, coming back around now. EP, cardiology, echo and PT/OT PT 16 and OT 16. Orthostatic b/p's negative.Met with patient to discuss role/transition of care.   SHe lives in 2 story home alone. Her PCP is Dr Taylor She owns a wlaker and a cane. No HHC or RAYA History She has been active PTA and her plan is home and her family will transport. Cm/sw to follow.Electronically signed by Emmy Vergara RN on 6/14/2024 at 9:36 AM    Met with patient and family @ bedside. [facility] and she is declining as she is very independent. Does want fww. No preference for company. Referral to [facility] with Kath [facility] and ntfd.of discharge order. Electronically signed by Emmy Vergara RN on 6/14/2024 at 2:02 PM      The Plan for Transition of Care is related to the following treatment goals: ambulation safety fww    The Patient and/or patient representative  was provided with a choice of provider and agrees with the discharge plan. [x] Yes [] No    Freedom of choice list was provided with basic dialogue that supports the patient's individualized plan of care/goals, treatment preferences and shares the quality data associated with the providers. [x] Yes [] No

## 2024-06-14 NOTE — ACP (ADVANCE CARE PLANNING)
Advance Care Planning   Healthcare Decision Maker:    Primary Decision Maker: Jada Ragsdale - Daughter-in-Law - [phone]    Click here to complete Healthcare Decision Makers including selection of the Healthcare Decision Maker Relationship (ie \"Primary\").

## 2024-06-29 ENCOUNTER — APPOINTMENT (OUTPATIENT)
Dept: CT IMAGING | Age: 84
End: 2024-06-29
Payer: MEDICARE

## 2024-06-29 ENCOUNTER — HOSPITAL ENCOUNTER (INPATIENT)
Age: 84
LOS: 6 days | Discharge: NURSING, REHAB OR LONG-TERM CARE | End: 2024-07-05
Attending: EMERGENCY MEDICINE | Admitting: STUDENT IN AN ORGANIZED HEALTH CARE EDUCATION/TRAINING PROGRAM
Payer: MEDICARE

## 2024-06-29 ENCOUNTER — APPOINTMENT (OUTPATIENT)
Dept: GENERAL RADIOLOGY | Age: 84
End: 2024-06-29
Payer: MEDICARE

## 2024-06-29 DIAGNOSIS — I21.4 NSTEMI (NON-ST ELEVATED MYOCARDIAL INFARCTION) (HCC): ICD-10-CM

## 2024-06-29 DIAGNOSIS — I45.9 HEART BLOCK: ICD-10-CM
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
## 2024-06-29 DIAGNOSIS — R00.1 BRADYCARDIA: ICD-10-CM

## 2024-06-29 DIAGNOSIS — R55 SYNCOPE AND COLLAPSE: ICD-10-CM

## 2024-06-29 LAB
ALBUMIN SERPL-MCNC: 3.9 G/DL (ref 3.5–5.2)
ALP SERPL-CCNC: 73 U/L (ref 35–104)
ALT SERPL-CCNC: 23 U/L (ref 0–32)
ANION GAP SERPL CALCULATED.3IONS-SCNC: 13 MMOL/L (ref 7–16)
AST SERPL-CCNC: 28 U/L (ref 0–31)
BASOPHILS # BLD: 0.07 K/UL (ref 0–0.2)
BASOPHILS NFR BLD: 0 % (ref 0–2)
BILIRUB SERPL-MCNC: 0.2 MG/DL (ref 0–1.2)
BUN SERPL-MCNC: 22 MG/DL (ref 6–23)
CALCIUM SERPL-MCNC: 9.3 MG/DL (ref 8.6–10.2)
CHLORIDE SERPL-SCNC: 100 MMOL/L (ref 98–107)
CO2 SERPL-SCNC: 23 MMOL/L (ref 22–29)
CREAT SERPL-MCNC: 0.9 MG/DL (ref 0.5–1)
ECHO BSA: 1.44 M2
EOSINOPHIL # BLD: 0.03 K/UL (ref 0.05–0.5)
EOSINOPHILS RELATIVE PERCENT: 0 % (ref 0–6)
ERYTHROCYTE [DISTWIDTH] IN BLOOD BY AUTOMATED COUNT: 14.1 % (ref 11.5–15)
GFR, ESTIMATED: 62 ML/MIN/1.73M2
GLUCOSE SERPL-MCNC: 164 MG/DL (ref 74–99)
HCT VFR BLD AUTO: 34.8 % (ref 34–48)
HGB BLD-MCNC: 11.3 G/DL (ref 11.5–15.5)
IMM GRANULOCYTES # BLD AUTO: 0.29 K/UL (ref 0–0.58)
IMM GRANULOCYTES NFR BLD: 2 % (ref 0–5)
LYMPHOCYTES NFR BLD: 1.51 K/UL (ref 1.5–4)
LYMPHOCYTES RELATIVE PERCENT: 9 % (ref 20–42)
MAGNESIUM SERPL-MCNC: 2.1 MG/DL (ref 1.6–2.6)
MCH RBC QN AUTO: 28.5 PG (ref 26–35)
MCHC RBC AUTO-ENTMCNC: 32.5 G/DL (ref 32–34.5)
MCV RBC AUTO: 87.7 FL (ref 80–99.9)
MONOCYTES NFR BLD: 0.8 K/UL (ref 0.1–0.95)
MONOCYTES NFR BLD: 5 % (ref 2–12)
NEUTROPHILS NFR BLD: 85 % (ref 43–80)
NEUTS SEG NFR BLD: 14.92 K/UL (ref 1.8–7.3)
PARTIAL THROMBOPLASTIN TIME: 23.9 SEC (ref 24.5–35.1)
PLATELET # BLD AUTO: 492 K/UL (ref 130–450)
PMV BLD AUTO: 9 FL (ref 7–12)
POTASSIUM SERPL-SCNC: 4.2 MMOL/L (ref 3.5–5)
PROT SERPL-MCNC: 7.2 G/DL (ref 6.4–8.3)
RBC # BLD AUTO: 3.97 M/UL (ref 3.5–5.5)
SODIUM SERPL-SCNC: 136 MMOL/L (ref 132–146)
TROPONIN I SERPL HS-MCNC: 24 NG/L (ref 0–9)
TROPONIN I SERPL HS-MCNC: 40 NG/L (ref 0–9)
TROPONIN I SERPL HS-MCNC: 54 NG/L (ref 0–9)
WBC OTHER # BLD: 17.6 K/UL (ref 4.5–11.5)

## 2024-06-29 PROCEDURE — 2500000003 HC RX 250 WO HCPCS: Performed by: INTERNAL MEDICINE

## 2024-06-29 PROCEDURE — 99223 1ST HOSP IP/OBS HIGH 75: CPT | Performed by: INTERNAL MEDICINE

## 2024-06-29 PROCEDURE — 2000000000 HC ICU R&B

## 2024-06-29 PROCEDURE — 5A12012 PERFORMANCE OF CARDIAC OUTPUT, SINGLE, MANUAL: ICD-10-PCS

## 2024-06-29 PROCEDURE — G0378 HOSPITAL OBSERVATION PER HR: HCPCS

## 2024-06-29 PROCEDURE — 6370000000 HC RX 637 (ALT 250 FOR IP): Performed by: EMERGENCY MEDICINE

## 2024-06-29 PROCEDURE — 73610 X-RAY EXAM OF ANKLE: CPT

## 2024-06-29 PROCEDURE — 83735 ASSAY OF MAGNESIUM: CPT

## 2024-06-29 PROCEDURE — 80053 COMPREHEN METABOLIC PANEL: CPT

## 2024-06-29 PROCEDURE — 6360000002 HC RX W HCPCS: Performed by: INTERNAL MEDICINE

## 2024-06-29 PROCEDURE — 85025 COMPLETE CBC W/AUTO DIFF WBC: CPT

## 2024-06-29 PROCEDURE — 33210 INSERT ELECTRD/PM CATH SNGL: CPT | Performed by: INTERNAL MEDICINE

## 2024-06-29 PROCEDURE — 84484 ASSAY OF TROPONIN QUANT: CPT

## 2024-06-29 PROCEDURE — 72125 CT NECK SPINE W/O DYE: CPT

## 2024-06-29 PROCEDURE — 99285 EMERGENCY DEPT VISIT HI MDM: CPT

## 2024-06-29 PROCEDURE — 93005 ELECTROCARDIOGRAM TRACING: CPT

## 2024-06-29 PROCEDURE — 5A1213Z PERFORMANCE OF CARDIAC PACING, INTERMITTENT: ICD-10-PCS | Performed by: INTERNAL MEDICINE

## 2024-06-29 PROCEDURE — 71045 X-RAY EXAM CHEST 1 VIEW: CPT

## 2024-06-29 PROCEDURE — 6360000002 HC RX W HCPCS

## 2024-06-29 PROCEDURE — 6360000004 HC RX CONTRAST MEDICATION: Performed by: RADIOLOGY

## 2024-06-29 PROCEDURE — 2580000003 HC RX 258

## 2024-06-29 PROCEDURE — 85730 THROMBOPLASTIN TIME PARTIAL: CPT

## 2024-06-29 PROCEDURE — 96374 THER/PROPH/DIAG INJ IV PUSH: CPT

## 2024-06-29 PROCEDURE — 2709999900 HC NON-CHARGEABLE SUPPLY: Performed by: INTERNAL MEDICINE

## 2024-06-29 PROCEDURE — 71260 CT THORAX DX C+: CPT

## 2024-06-29 PROCEDURE — C1894 INTRO/SHEATH, NON-LASER: HCPCS | Performed by: INTERNAL MEDICINE

## 2024-06-29 PROCEDURE — 70450 CT HEAD/BRAIN W/O DYE: CPT

## 2024-06-29 DEVICE — FLOW DIRECTED PACING CATHETER
Type: IMPLANTABLE DEVICE | Site: NECK | Status: FUNCTIONAL
Brand: PACEL™

## 2024-06-29 RX ORDER — FENTANYL CITRATE 50 UG/ML
INJECTION, SOLUTION INTRAMUSCULAR; INTRAVENOUS PRN
Status: DISCONTINUED | OUTPATIENT
Start: 2024-06-29 | End: 2024-06-29 | Stop reason: HOSPADM

## 2024-06-29 RX ORDER — ONDANSETRON 4 MG/1
4 TABLET, ORALLY DISINTEGRATING ORAL EVERY 8 HOURS PRN
Status: DISCONTINUED | OUTPATIENT
Start: 2024-06-29 | End: 2024-07-05 | Stop reason: HOSPADM

## 2024-06-29 RX ORDER — SODIUM CHLORIDE 0.9 % (FLUSH) 0.9 %
5-40 SYRINGE (ML) INJECTION PRN
Status: DISCONTINUED | OUTPATIENT
Start: 2024-06-29 | End: 2024-07-05 | Stop reason: HOSPADM

## 2024-06-29 RX ORDER — ENOXAPARIN SODIUM 100 MG/ML
40 INJECTION SUBCUTANEOUS DAILY
Status: DISCONTINUED | OUTPATIENT
Start: 2024-06-29 | End: 2024-06-29 | Stop reason: ALTCHOICE

## 2024-06-29 RX ORDER — PANTOPRAZOLE SODIUM 40 MG/1
40 TABLET, DELAYED RELEASE ORAL
Status: DISCONTINUED | OUTPATIENT
Start: 2024-06-30 | End: 2024-07-05 | Stop reason: HOSPADM

## 2024-06-29 RX ORDER — POLYETHYLENE GLYCOL 3350 17 G/17G
17 POWDER, FOR SOLUTION ORAL DAILY PRN
Status: DISCONTINUED | OUTPATIENT
Start: 2024-06-29 | End: 2024-07-05 | Stop reason: HOSPADM

## 2024-06-29 RX ORDER — HEPARIN SODIUM 1000 [USP'U]/ML
60 INJECTION, SOLUTION INTRAVENOUS; SUBCUTANEOUS PRN
Status: DISCONTINUED | OUTPATIENT
Start: 2024-06-29 | End: 2024-06-29

## 2024-06-29 RX ORDER — HEPARIN SODIUM 1000 [USP'U]/ML
60 INJECTION, SOLUTION INTRAVENOUS; SUBCUTANEOUS ONCE
Status: COMPLETED | OUTPATIENT
Start: 2024-06-29 | End: 2024-06-29

## 2024-06-29 RX ORDER — SODIUM CHLORIDE 9 MG/ML
INJECTION, SOLUTION INTRAVENOUS PRN
Status: DISCONTINUED | OUTPATIENT
Start: 2024-06-29 | End: 2024-07-05 | Stop reason: HOSPADM

## 2024-06-29 RX ORDER — 0.9 % SODIUM CHLORIDE 0.9 %
1000 INTRAVENOUS SOLUTION INTRAVENOUS ONCE
Status: COMPLETED | OUTPATIENT
Start: 2024-06-29 | End: 2024-06-29

## 2024-06-29 RX ORDER — ONDANSETRON 2 MG/ML
4 INJECTION INTRAMUSCULAR; INTRAVENOUS EVERY 6 HOURS PRN
Status: DISCONTINUED | OUTPATIENT
Start: 2024-06-29 | End: 2024-07-05 | Stop reason: HOSPADM

## 2024-06-29 RX ORDER — MAGNESIUM SULFATE IN WATER 40 MG/ML
2000 INJECTION, SOLUTION INTRAVENOUS PRN
Status: DISCONTINUED | OUTPATIENT
Start: 2024-06-29 | End: 2024-07-05 | Stop reason: HOSPADM

## 2024-06-29 RX ORDER — POTASSIUM CHLORIDE 20 MEQ/1
40 TABLET, EXTENDED RELEASE ORAL PRN
Status: DISCONTINUED | OUTPATIENT
Start: 2024-06-29 | End: 2024-07-05 | Stop reason: HOSPADM

## 2024-06-29 RX ORDER — ACETAMINOPHEN 325 MG/1
650 TABLET ORAL EVERY 6 HOURS PRN
Status: DISCONTINUED | OUTPATIENT
Start: 2024-06-29 | End: 2024-07-05 | Stop reason: HOSPADM

## 2024-06-29 RX ORDER — MIDAZOLAM HYDROCHLORIDE 1 MG/ML
INJECTION INTRAMUSCULAR; INTRAVENOUS PRN
Status: DISCONTINUED | OUTPATIENT
Start: 2024-06-29 | End: 2024-06-29 | Stop reason: HOSPADM

## 2024-06-29 RX ORDER — HEPARIN SODIUM 1000 [USP'U]/ML
30 INJECTION, SOLUTION INTRAVENOUS; SUBCUTANEOUS PRN
Status: DISCONTINUED | OUTPATIENT
Start: 2024-06-29 | End: 2024-06-29

## 2024-06-29 RX ORDER — ASPIRIN 81 MG/1
324 TABLET, CHEWABLE ORAL ONCE
Status: COMPLETED | OUTPATIENT
Start: 2024-06-29 | End: 2024-06-29

## 2024-06-29 RX ORDER — FENTANYL CITRATE 50 UG/ML
50 INJECTION, SOLUTION INTRAMUSCULAR; INTRAVENOUS ONCE
Status: COMPLETED | OUTPATIENT
Start: 2024-06-29 | End: 2024-06-29

## 2024-06-29 RX ORDER — SODIUM CHLORIDE 0.9 % (FLUSH) 0.9 %
5-40 SYRINGE (ML) INJECTION EVERY 12 HOURS SCHEDULED
Status: DISCONTINUED | OUTPATIENT
Start: 2024-06-29 | End: 2024-07-05 | Stop reason: HOSPADM

## 2024-06-29 RX ORDER — POTASSIUM CHLORIDE 7.45 MG/ML
10 INJECTION INTRAVENOUS PRN
Status: DISCONTINUED | OUTPATIENT
Start: 2024-06-29 | End: 2024-07-05 | Stop reason: HOSPADM

## 2024-06-29 RX ORDER — ACETAMINOPHEN 650 MG/1
650 SUPPOSITORY RECTAL EVERY 6 HOURS PRN
Status: DISCONTINUED | OUTPATIENT
Start: 2024-06-29 | End: 2024-07-05 | Stop reason: HOSPADM

## 2024-06-29 RX ORDER — SODIUM CHLORIDE 9 MG/ML
INJECTION, SOLUTION INTRAVENOUS CONTINUOUS
Status: DISCONTINUED | OUTPATIENT
Start: 2024-06-29 | End: 2024-06-29

## 2024-06-29 RX ORDER — HEPARIN SODIUM 10000 [USP'U]/100ML
5-30 INJECTION, SOLUTION INTRAVENOUS CONTINUOUS
Status: DISCONTINUED | OUTPATIENT
Start: 2024-06-29 | End: 2024-06-29

## 2024-06-29 RX ADMIN — ASPIRIN 81 MG 324 MG: 81 TABLET ORAL at 18:06

## 2024-06-29 RX ADMIN — HEPARIN SODIUM 12 UNITS/KG/HR: 10000 INJECTION, SOLUTION INTRAVENOUS at 18:05

## 2024-06-29 RX ADMIN — HEPARIN SODIUM 3100 UNITS: 1000 INJECTION INTRAVENOUS; SUBCUTANEOUS at 18:04

## 2024-06-29 RX ADMIN — IOPAMIDOL 75 ML: 755 INJECTION, SOLUTION INTRAVENOUS at 17:38

## 2024-06-29 RX ADMIN — SODIUM CHLORIDE 1000 ML: 9 INJECTION, SOLUTION INTRAVENOUS at 14:17

## 2024-06-29 RX ADMIN — FENTANYL CITRATE 50 MCG: 50 INJECTION INTRAMUSCULAR; INTRAVENOUS at 14:22

## 2024-06-29 ASSESSMENT — PAIN SCALES - GENERAL: PAINLEVEL_OUTOF10: 8

## 2024-06-29 ASSESSMENT — PAIN DESCRIPTION - LOCATION: LOCATION: HEAD

## 2024-06-29 NOTE — PROGRESS NOTES
Radiology Procedure Waiver   Name: Shante Ragsdale  : 1940  MRN: [id number]    Date:  24    Time: 2:00 PM EDT    Benefits of immediately proceeding with radiology exam(s) without pre-testing outweigh the risks or are not indicated as specified below and therefore the following is/are being waived:    [x] Benefits of immediate radiology exam(s) outweigh any risk.                                               OR    Pre-exam testing is not indicated for the following reason(s):  [] Pregnancy test   [] Patients LMP on-time and regular.   [] Patient had Tubal Ligation or has other Contraception Device.   [] Patient  is Menopausal or Premenarcheal.    [] Patient had Full or Partial Hysterectomy.    [] Protocol for CT contrast allegry   [] Patient has tolerated well previously   [] Patient does not have a true allergy    [] MRI Questionnaire     [] BUN/Creatinine   [] Patient age w/no hx of renal dysfunction.   [] Patient on Dialysis.   [] Recent Normal Labs.  Electronically signed by Michelle Farris MD on 24 at 2:00 PM EDT          Electronically signed by Michelle Farris MD on 2024 at 2:00 PM

## 2024-06-29 NOTE — ED PROVIDER NOTES
[facility] EMERGENCY DEPARTMENT  EMERGENCY DEPARTMENT ENCOUNTER        Pt Name: Shante Ragsdale  MRN: [id number]  Birthdate 1940  Date of evaluation: 6/29/2024  Provider: Michelle Farris MD  PCP: Taisha Taylor DO  Note Started: 2:03 PM EDT 6/29/24    CHIEF COMPLAINT       Chief Complaint   Patient presents with    Loss of Consciousness     Ems was called for a fall. Pt fell back and ems reports asystole. 1min cpr performed and pt now awake and alert       HISTORY OF PRESENT ILLNESS: 1 or more Elements   History From: Patient, EMR review, and family members present at bedside.    Shante Ragsdale is a 84 y.o. female who presents to the ED from home by EMS today after sustaining a fall and hitting the back of her head.  Patient states that she was picking berries up in her driveway around 11:30 this morning, when she fell and hit the back of her head.  Patient is unsure how long she lost consciousness for but believes it may have been around an hour.  Patient states that she did not trip, but she blacked out and fell as a result.  She denies being on blood thinners.  When she regained consciousness, she crawled on the floor, called her friend, and patient was brought here by EMS to the ED.  Patient does not really have much of a recollection of what happened.  Per EMS, patient went into asystole, 1 round of CPR was performed and patient was alert and oriented when in the ED.  Upon initial evaluation, patient was endorsing pain in the back of her head as well as left ankle.  She denies having any chest pain, palpitations, shortness of breath, abdominal pain, vomiting, lightheadedness, blurry vision, headaches, or any other associated symptoms.  Patient does follow-up with Dr. Ferris from [facility] cardiology.    Nursing Notes were all reviewed and agreed with or any disagreements were addressed in the HPI.    REVIEW OF EXTERNAL NOTE :       Patient was recently  reactive in nature.  Initial troponin was 24.  Her repeat troponin was 40 and third troponin was elevated at 54.  Of note, patient was recently in the ED on 6/12/2024 for syncope and collapse after sustaining a fall.  Troponins at the time were 24 with a repeat of 36.  She was in third-degree AV block which spontaneously resolved.  Patient followed up with her cardiologist from [facility] and she was given a 30-day heart monitor.  EP was consulted from the ED.  Patient was discussed with [facility] hospitalist who accepted patient for admission for symptomatic AV block.  Upon reevaluation, patient was endorsing chest pressure in the middle of her chest without radiation.  Repeat EKG was obtained and patient was bradycardic sustaining in the 30s.  Given her increasing troponins, patient was started on heparin drip in the ED for concerns of NSTEMI.  She was also given 324 mg of aspirin once.  CT chest was obtained and there were no signs of acute cardiopulmonary process.  However, there was fluid within the esophagus suggesting signs of and significant reflux as well as fullness of the intrahepatic ducts and a large fixed hiatus hernia.  Upon reevaluation, patient stated that her chest pressure had significantly improved.  Patient's scalp was evaluated and only dried blood was noted to the back of the head with no evidence of a laceration. Discussed findings with patient as well as family present at bedside who are agreeable with admission as well.  Patient will likely need a pacemaker given the frequency of her symptoms.    ED Course as of 06/29/24 1936   Sat Jun 29, 2024   1546 Patient discussed with Dr. Herzog who accepted her for admission. [SA]      ED Course User Index  [SA] Michelle Farris MD          CONSULTS:  IP CONSULT TO ELECTROPHYSIOLOGY  IP CONSULT TO HOSPITALIST      I am the Primary Clinician of Record.    FINAL IMPRESSION      1. Fall from bed, subsequent encounter    2. Syncope and collapse    3. Heart

## 2024-06-30 PROBLEM — I44.2 COMPLETE HEART BLOCK (HCC): Status: ACTIVE | Noted: 2024-06-30

## 2024-06-30 LAB
ANION GAP SERPL CALCULATED.3IONS-SCNC: 14 MMOL/L (ref 7–16)
BASOPHILS # BLD: 0.01 K/UL (ref 0–0.2)
BASOPHILS NFR BLD: 0 % (ref 0–2)
BUN SERPL-MCNC: 16 MG/DL (ref 6–23)
CALCIUM SERPL-MCNC: 9.3 MG/DL (ref 8.6–10.2)
CHLORIDE SERPL-SCNC: 100 MMOL/L (ref 98–107)
CO2 SERPL-SCNC: 23 MMOL/L (ref 22–29)
CREAT SERPL-MCNC: 0.6 MG/DL (ref 0.5–1)
EOSINOPHIL # BLD: 0 K/UL (ref 0.05–0.5)
EOSINOPHILS RELATIVE PERCENT: 0 % (ref 0–6)
ERYTHROCYTE [DISTWIDTH] IN BLOOD BY AUTOMATED COUNT: 14.1 % (ref 11.5–15)
GFR, ESTIMATED: 88 ML/MIN/1.73M2
GLUCOSE SERPL-MCNC: 113 MG/DL (ref 74–99)
HCT VFR BLD AUTO: 34.8 % (ref 34–48)
HGB BLD-MCNC: 11.3 G/DL (ref 11.5–15.5)
IMM GRANULOCYTES # BLD AUTO: 0.06 K/UL (ref 0–0.58)
IMM GRANULOCYTES NFR BLD: 1 % (ref 0–5)
LYMPHOCYTES NFR BLD: 0.68 K/UL (ref 1.5–4)
LYMPHOCYTES RELATIVE PERCENT: 6 % (ref 20–42)
MAGNESIUM SERPL-MCNC: 2.2 MG/DL (ref 1.6–2.6)
MCH RBC QN AUTO: 27.8 PG (ref 26–35)
MCHC RBC AUTO-ENTMCNC: 32.5 G/DL (ref 32–34.5)
MCV RBC AUTO: 85.7 FL (ref 80–99.9)
MONOCYTES NFR BLD: 0.35 K/UL (ref 0.1–0.95)
MONOCYTES NFR BLD: 3 % (ref 2–12)
NEUTROPHILS NFR BLD: 91 % (ref 43–80)
NEUTS SEG NFR BLD: 10.82 K/UL (ref 1.8–7.3)
PLATELET # BLD AUTO: 462 K/UL (ref 130–450)
PMV BLD AUTO: 8.7 FL (ref 7–12)
POTASSIUM SERPL-SCNC: 3.9 MMOL/L (ref 3.5–5)
RBC # BLD AUTO: 4.06 M/UL (ref 3.5–5.5)
SODIUM SERPL-SCNC: 137 MMOL/L (ref 132–146)
TROPONIN I SERPL HS-MCNC: 76 NG/L (ref 0–9)
WBC OTHER # BLD: 11.9 K/UL (ref 4.5–11.5)

## 2024-06-30 PROCEDURE — 2000000000 HC ICU R&B

## 2024-06-30 PROCEDURE — 2580000003 HC RX 258: Performed by: INTERNAL MEDICINE

## 2024-06-30 PROCEDURE — 6370000000 HC RX 637 (ALT 250 FOR IP): Performed by: INTERNAL MEDICINE

## 2024-06-30 PROCEDURE — 80048 BASIC METABOLIC PNL TOTAL CA: CPT

## 2024-06-30 PROCEDURE — 99233 SBSQ HOSP IP/OBS HIGH 50: CPT | Performed by: INTERNAL MEDICINE

## 2024-06-30 PROCEDURE — 84484 ASSAY OF TROPONIN QUANT: CPT

## 2024-06-30 PROCEDURE — G0378 HOSPITAL OBSERVATION PER HR: HCPCS

## 2024-06-30 PROCEDURE — 85025 COMPLETE CBC W/AUTO DIFF WBC: CPT

## 2024-06-30 PROCEDURE — 83735 ASSAY OF MAGNESIUM: CPT

## 2024-06-30 RX ADMIN — SODIUM CHLORIDE, PRESERVATIVE FREE 10 ML: 5 INJECTION INTRAVENOUS at 20:21

## 2024-06-30 RX ADMIN — PANTOPRAZOLE SODIUM 40 MG: 40 TABLET, DELAYED RELEASE ORAL at 06:12

## 2024-06-30 RX ADMIN — SODIUM CHLORIDE, PRESERVATIVE FREE 10 ML: 5 INJECTION INTRAVENOUS at 09:00

## 2024-06-30 ASSESSMENT — PAIN SCALES - GENERAL
PAINLEVEL_OUTOF10: 0

## 2024-06-30 NOTE — PROGRESS NOTES
4 Eyes Skin Assessment     NAME:  Shante Ragsdale  YOB: 1940  MEDICAL RECORD NUMBER:  [id number]    The patient is being assessed for  Cath Lab Post-Op    I agree that at least one RN has performed a thorough Head to Toe Skin Assessment on the patient. ALL assessment sites listed below have been assessed.      Areas assessed by both nurses:    Head, Face, Ears, Shoulders, Back, Chest, Arms, Elbows, Hands, Sacrum. Buttock, Coccyx, Ischium, Legs. Feet and Heels, and Under Medical Devices         Does the Patient have a Wound? Yes wound(s) were present on assessment. LDA wound assessment was Initiated and completed by RN  Posterior Head lac        Sheldon Prevention initiated by RN: Yes  Wound Care Orders initiated by RN: No    Pressure Injury (Stage 3,4, Unstageable, DTI, NWPT, and Complex wounds) if present, place Wound referral order by RN under : No    New Ostomies, if present place, Ostomy referral order under : No     Nurse 1 eSignature: Electronically signed by Stoney Cordero RN on 6/29/24 at 11:12 PM EDT    **SHARE this note so that the co-signing nurse can place an eSignature**    Nurse 2 eSignature: {Esignature:[id number]}

## 2024-06-30 NOTE — PLAN OF CARE
Problem: Cardiovascular - Adult  Goal: Maintains optimal cardiac output and hemodynamic stability  6/30/2024 1936 by Stoney Cordero RN  Outcome: Progressing  Flowsheets (Taken 6/30/2024 1936)  Maintains optimal cardiac output and hemodynamic stability:   Monitor blood pressure and heart rate   Monitor urine output and notify Licensed Independent Practitioner for values outside of normal range   Assess for signs of decreased cardiac output   Administer fluid and/or volume expanders as ordered   Administer vasoactive medications as ordered  6/30/2024 0951 by Phyllis Lopez RN  Outcome: Progressing  Flowsheets (Taken 6/29/2024 2235 by Stoney Cordero, RN)  Maintains optimal cardiac output and hemodynamic stability:   Monitor blood pressure and heart rate   Monitor urine output and notify Licensed Independent Practitioner for values outside of normal range   Assess for signs of decreased cardiac output   Administer fluid and/or volume expanders as ordered   Administer vasoactive medications as ordered  Note: MONITOR VITAL SIGN ADM MEDS AS ORDERED , MONITOR TEMP. PACER SITE FOR BLEEDING MONITOR PACER SETTINGS     Problem: Cardiovascular - Adult  Goal: Absence of cardiac dysrhythmias or at baseline  Outcome: Progressing  Flowsheets (Taken 6/30/2024 1936)  Absence of cardiac dysrhythmias or at baseline:   Monitor cardiac rate and rhythm   Administer antiarrhythmia medication and electrolyte replacement as ordered   Assess for signs of decreased cardiac output     Problem: Safety - Adult  Goal: Free from fall injury  6/30/2024 0951 by Phyllis Lopez RN  Outcome: Progressing  Flowsheets  Taken 6/30/2024 0951 by Phyllis Lopez RN  Free From Fall Injury:   Instruct family/caregiver on patient safety   Based on caregiver fall risk screen, instruct family/caregiver to ask for assistance with transferring infant if caregiver noted to have fall risk factors  Taken 6/29/2024 2330 by Delfina Bravo RN  Free From Fall Injury:  Instruct family/caregiver on patient safety  Note: INSTRUCT PT ON USE OF CALL LIGHT FREQUENT ROUNDING ON PT SIDERAILS ELEVATED X4

## 2024-06-30 NOTE — PLAN OF CARE
Problem: Cardiovascular - Adult  Goal: Maintains optimal cardiac output and hemodynamic stability  6/30/2024 0951 by Phyllis Lopez, RN  Outcome: Progressing  Note: MONITOR VITAL SIGN ADM MEDS AS ORDERED , MONITOR TEMP. PACER SITE FOR BLEEDING MONITOR PACER SETTINGS     Problem: Safety - Adult  Goal: Free from fall injury  Outcome: Progressing  Note: INSTRUCT PT ON USE OF CALL LIGHT FREQUENT ROUNDING ON PT SIDERAILS ELEVATED X4     Problem: Pain  Goal: Verbalizes/displays adequate comfort level or baseline comfort level  Outcome: Progressing  Note: MONITOR PAIN LEVEL ADM MEDS AS ORDERED REPOSITION PT PROVIDE CALM ENVIRONMENT

## 2024-06-30 NOTE — PLAN OF CARE
Problem: Cardiovascular - Adult  Goal: Maintains optimal cardiac output and hemodynamic stability  Outcome: Progressing  Flowsheets (Taken 6/29/2024 2235)  Maintains optimal cardiac output and hemodynamic stability:   Monitor blood pressure and heart rate   Monitor urine output and notify Licensed Independent Practitioner for values outside of normal range   Assess for signs of decreased cardiac output   Administer fluid and/or volume expanders as ordered   Administer vasoactive medications as ordered  Goal: Absence of cardiac dysrhythmias or at baseline  Outcome: Progressing  Flowsheets (Taken 6/29/2024 2235)  Absence of cardiac dysrhythmias or at baseline:   Monitor cardiac rate and rhythm   Assess for signs of decreased cardiac output   Administer antiarrhythmia medication and electrolyte replacement as ordered

## 2024-06-30 NOTE — CONSULTS
[facility]- [facility] [place] [place] Electrophysiology  Inpatient progress note  PATIENT: Shante Ragsdale  MEDICAL RECORD NUMBER: [id number]  DATE OF SERVICE:  6/29/2024  ATTENDING ELECTROPHYSIOLOGIST: Angella Briceño MD  PRIMARY ELECTROPHYSIOLOGIST: Zana Lin DO  REFERRING PHYSICIAN: No ref. provider found and Taisha Taylor DO  CHIEF COMPLAINT: Syncope    HPI: This is a 84 y.o. female with a history of hyperlipidemia, gastroesophageal reflux, hypertension noted in September 2023 when she was started on amlodipine.  She is usually seen by Dr. Taylor, her primary care physician and was referred to [facility] cardiology for complaints of exertional dyspnea.  The patient describes episodes of dyspnea that occur sporadically although she is able to undertake physical activity at other times without difficulty.  I do not have [facility] cardiology notes for review but she was placed on isosorbide by them.  The patient started noticing episodes of syncope this year.  The first episode occurred while she was moving a table in April when she fell in her basement and did not seek medical help noted she in fact inform her family for several days.  Another episode occurred when she walked into a [email or web address] game at about noon.  This episode led to her hospitalization 2 weeks ago when she was seen by Dr. Lin and Jamie.  Although complete heart block was documented by EMS there were no strips available for review and the patient was discharged home with an event monitor.  She had an third episode while having a bowel movement in the morning and the fourth episode which led to this emergency room visit.  This morning she went outside her home at about 11:30 in the morning to pick berries.  She was walking back into her home when all of a sudden she passed out and fell on her driveway.  She awakened spontaneously a few minutes later and was able to use her cell phone to call a friend and EMS was

## 2024-06-30 NOTE — FLOWSHEET NOTE
DR BERGMAN HERE TO SEE PT FAMILY PRESENT , PACE MAKER PROCEDURE EXPLAINED , TO SCHEDULE FOR 7/1/24.

## 2024-06-30 NOTE — FLOWSHEET NOTE
Pt arrived to PCCU with the following belongings. Pt oriented to room and all questions answered.   06/29/24 2014   Belongings   Dental Appliances Uppers;At bedside   Vision - Corrective Lenses Eyeglasses;At bedside   Hearing Aid None   Clothing Socks;At bedside   Jewelry Ring;Watch;Necklace;At bedside   Electronic Devices Other (Comment);Cell Phone;;At bedside  (heart monitor)   Weapons (Notify Protective Services/Security) None   Other Valuables At home   Home Medications None   Valuables Given To Patient   Provide Name(s) of Who Valuable(s) Were Given To Shante

## 2024-06-30 NOTE — PROGRESS NOTES
Beds notified of Dr Briceño activating the cath lab for [facility] and transfer to [facility].Keely Zhou RN    3318: Cath lab updated via phone re: TVP. States they will be here in 25-30 minutes.Keely Zhou RN

## 2024-06-30 NOTE — H&P
[facility]              [address] [place] [address]                           HISTORY & PHYSICAL      PATIENT NAME: ENEIDA FRANCIS              : 1940  MED REC NO: [id number]                        ROOM: [id number]  ACCOUNT NO: [id number]                       ADMIT DATE: 2024  PROVIDER: Niraj Herzog DO      PRIMARY CARE PHYSICIAN:  Taisha Taylor DO    CHIEF COMPLAINT:  Heart block.    HISTORY OF PRESENT ILLNESS:  The patient is an 84-year-old  female who presented to the emergency room complaining of syncope.  She has syncopal episode.  EMS reports asystole.  CPR was performed.  She was seen in the emergency room at which time she was in sinus rhythm with first-degree heart block.  She was admitted to the hospital, soon thereafter had a third-degree heart block, had emergent temporary pacemaker placement and admitted to the intensive care unit.    MEDICATIONS:  Prior to admission:  Amlodipine, Nexium, and Imdur.    PAST MEDICAL HISTORY:  Syncope, hypertension, vitamin D deficiency, spinal stenosis, and GERD.    PAST SURGICAL HISTORY:  Appendectomy, back surgery, cervical fusion, cholecystectomy, hernia repair, hysterectomy, joint replacement, and tonsillectomy.    SOCIAL HISTORY:  No tobacco, no alcohol.    REVIEW OF SYSTEMS:  Remarkable for above-stated chief complaint.    ALLERGIES:  TO PERCOCET, PREDNISONE, AND VICODIN.      PHYSICAL EXAMINATION:  GENERAL APPEARANCE:  Reveals an 84-year-old  female who is seen in the intensive care unit.  She is alert, cooperative, and a good historian.  VITAL SIGNS:  On admission:  Temperature 98.9, pulse 102, respirations 16, and blood pressure 164/84.  HEAD:  Normocephalic, atraumatic.  EYES:  Pupils equal and reactive to light.  Extraocular muscles intact.  Fundi not well visualized. NOSE:  No obstruction, polyp, or discharge noted.  MOUTH:  Mucosa without lesion.  Teeth, upper

## 2024-06-30 NOTE — PROGRESS NOTES
Patient arrived from [facility] to unit at 1135  Perma temp in right IJ   Pacer settings VVI with rate of 60, Ma 20, sensitivity of 3. Confirmed with Ria from CVL that these are the correct settings. Verified that  wanted Ma set that high.

## 2024-06-30 NOTE — PROGRESS NOTES
Patient arrived to CVICU, at 2215. Patient admitted to [facility] with the following belongings:  Staci(jose). The following belongings admitted with the patient, ALL, were sent home with the patient's family.

## 2024-07-01 ENCOUNTER — APPOINTMENT (OUTPATIENT)
Dept: GENERAL RADIOLOGY | Age: 84
End: 2024-07-01
Payer: MEDICARE

## 2024-07-01 ENCOUNTER — ANESTHESIA (OUTPATIENT)
Age: 84
End: 2024-07-01
Payer: MEDICARE

## 2024-07-01 ENCOUNTER — ANESTHESIA EVENT (OUTPATIENT)
Age: 84
End: 2024-07-01
Payer: MEDICARE

## 2024-07-01 PROBLEM — Z95.0 S/P PLACEMENT OF CARDIAC PACEMAKER: Status: ACTIVE | Noted: 2024-07-01

## 2024-07-01 LAB
ANION GAP SERPL CALCULATED.3IONS-SCNC: 10 MMOL/L (ref 7–16)
BASOPHILS # BLD: 0.04 K/UL (ref 0–0.2)
BASOPHILS NFR BLD: 1 % (ref 0–2)
BUN SERPL-MCNC: 18 MG/DL (ref 6–23)
CALCIUM SERPL-MCNC: 8.4 MG/DL (ref 8.6–10.2)
CHLORIDE SERPL-SCNC: 103 MMOL/L (ref 98–107)
CO2 SERPL-SCNC: 24 MMOL/L (ref 22–29)
CREAT SERPL-MCNC: 0.7 MG/DL (ref 0.5–1)
ECHO BSA: 1.44 M2
EKG ATRIAL RATE: 49 BPM
EKG P AXIS: 66 DEGREES
EKG P-R INTERVAL: 248 MS
EKG Q-T INTERVAL: 526 MS
EKG QRS DURATION: 124 MS
EKG QTC CALCULATION (BAZETT): 475 MS
EKG R AXIS: 64 DEGREES
EKG T AXIS: 70 DEGREES
EKG VENTRICULAR RATE: 49 BPM
EOSINOPHIL # BLD: 0.08 K/UL (ref 0.05–0.5)
EOSINOPHILS RELATIVE PERCENT: 1 % (ref 0–6)
ERYTHROCYTE [DISTWIDTH] IN BLOOD BY AUTOMATED COUNT: 14.3 % (ref 11.5–15)
GFR, ESTIMATED: 81 ML/MIN/1.73M2
GLUCOSE SERPL-MCNC: 95 MG/DL (ref 74–99)
HCT VFR BLD AUTO: 34.3 % (ref 34–48)
HGB BLD-MCNC: 11.3 G/DL (ref 11.5–15.5)
IMM GRANULOCYTES # BLD AUTO: 0.03 K/UL (ref 0–0.58)
IMM GRANULOCYTES NFR BLD: 0 % (ref 0–5)
LYMPHOCYTES NFR BLD: 1.56 K/UL (ref 1.5–4)
LYMPHOCYTES RELATIVE PERCENT: 22 % (ref 20–42)
MCH RBC QN AUTO: 28.5 PG (ref 26–35)
MCHC RBC AUTO-ENTMCNC: 32.9 G/DL (ref 32–34.5)
MCV RBC AUTO: 86.4 FL (ref 80–99.9)
MONOCYTES NFR BLD: 0.71 K/UL (ref 0.1–0.95)
MONOCYTES NFR BLD: 10 % (ref 2–12)
NEUTROPHILS NFR BLD: 66 % (ref 43–80)
NEUTS SEG NFR BLD: 4.71 K/UL (ref 1.8–7.3)
PLATELET # BLD AUTO: 383 K/UL (ref 130–450)
PMV BLD AUTO: 9.1 FL (ref 7–12)
POTASSIUM SERPL-SCNC: 3.8 MMOL/L (ref 3.5–5)
RBC # BLD AUTO: 3.97 M/UL (ref 3.5–5.5)
SODIUM SERPL-SCNC: 137 MMOL/L (ref 132–146)
WBC OTHER # BLD: 7.1 K/UL (ref 4.5–11.5)

## 2024-07-01 PROCEDURE — G0378 HOSPITAL OBSERVATION PER HR: HCPCS

## 2024-07-01 PROCEDURE — 2580000003 HC RX 258: Performed by: INTERNAL MEDICINE

## 2024-07-01 PROCEDURE — C1769 GUIDE WIRE: HCPCS | Performed by: INTERNAL MEDICINE

## 2024-07-01 PROCEDURE — 33208 INSRT HEART PM ATRIAL & VENT: CPT | Performed by: INTERNAL MEDICINE

## 2024-07-01 PROCEDURE — C1889 IMPLANT/INSERT DEVICE, NOC: HCPCS | Performed by: INTERNAL MEDICINE

## 2024-07-01 PROCEDURE — C1898 LEAD, PMKR, OTHER THAN TRANS: HCPCS | Performed by: INTERNAL MEDICINE

## 2024-07-01 PROCEDURE — 02HK3JZ INSERTION OF PACEMAKER LEAD INTO RIGHT VENTRICLE, PERCUTANEOUS APPROACH: ICD-10-PCS | Performed by: INTERNAL MEDICINE

## 2024-07-01 PROCEDURE — 93010 ELECTROCARDIOGRAM REPORT: CPT | Performed by: INTERNAL MEDICINE

## 2024-07-01 PROCEDURE — 3E0132A INTRODUCTION OF ANTI-INFECTIVE ENVELOPE INTO SUBCUTANEOUS TISSUE, PERCUTANEOUS APPROACH: ICD-10-PCS | Performed by: INTERNAL MEDICINE

## 2024-07-01 PROCEDURE — 2500000003 HC RX 250 WO HCPCS: Performed by: INTERNAL MEDICINE

## 2024-07-01 PROCEDURE — 6360000004 HC RX CONTRAST MEDICATION: Performed by: INTERNAL MEDICINE

## 2024-07-01 PROCEDURE — 71045 X-RAY EXAM CHEST 1 VIEW: CPT

## 2024-07-01 PROCEDURE — 7100000001 HC PACU RECOVERY - ADDTL 15 MIN

## 2024-07-01 PROCEDURE — 85025 COMPLETE CBC W/AUTO DIFF WBC: CPT

## 2024-07-01 PROCEDURE — 0JH606Z INSERTION OF PACEMAKER, DUAL CHAMBER INTO CHEST SUBCUTANEOUS TISSUE AND FASCIA, OPEN APPROACH: ICD-10-PCS | Performed by: INTERNAL MEDICINE

## 2024-07-01 PROCEDURE — 6360000002 HC RX W HCPCS: Performed by: INTERNAL MEDICINE

## 2024-07-01 PROCEDURE — 6370000000 HC RX 637 (ALT 250 FOR IP): Performed by: INTERNAL MEDICINE

## 2024-07-01 PROCEDURE — C1785 PMKR, DUAL, RATE-RESP: HCPCS | Performed by: INTERNAL MEDICINE

## 2024-07-01 PROCEDURE — 6360000002 HC RX W HCPCS: Performed by: NURSE ANESTHETIST, CERTIFIED REGISTERED

## 2024-07-01 PROCEDURE — 3700000000 HC ANESTHESIA ATTENDED CARE: Performed by: INTERNAL MEDICINE

## 2024-07-01 PROCEDURE — 2580000003 HC RX 258: Performed by: NURSE ANESTHETIST, CERTIFIED REGISTERED

## 2024-07-01 PROCEDURE — 80048 BASIC METABOLIC PNL TOTAL CA: CPT

## 2024-07-01 PROCEDURE — 7100000000 HC PACU RECOVERY - FIRST 15 MIN

## 2024-07-01 PROCEDURE — C1892 INTRO/SHEATH,FIXED,PEEL-AWAY: HCPCS | Performed by: INTERNAL MEDICINE

## 2024-07-01 PROCEDURE — 1200000000 HC SEMI PRIVATE

## 2024-07-01 PROCEDURE — 2709999900 HC NON-CHARGEABLE SUPPLY: Performed by: INTERNAL MEDICINE

## 2024-07-01 PROCEDURE — 02H63JZ INSERTION OF PACEMAKER LEAD INTO RIGHT ATRIUM, PERCUTANEOUS APPROACH: ICD-10-PCS | Performed by: INTERNAL MEDICINE

## 2024-07-01 PROCEDURE — 3700000001 HC ADD 15 MINUTES (ANESTHESIA): Performed by: INTERNAL MEDICINE

## 2024-07-01 DEVICE — DR PACEMAKER DDDR
Type: IMPLANTABLE DEVICE | Site: HEART | Status: FUNCTIONAL
Brand: ASSURITY MRI™

## 2024-07-01 DEVICE — ENVELOPE CMRM6133 ABSORB LRG MR
Type: IMPLANTABLE DEVICE | Site: CHEST  WALL | Status: FUNCTIONAL
Brand: TYRX™

## 2024-07-01 DEVICE — PACING LEAD
Type: IMPLANTABLE DEVICE | Site: HEART | Status: FUNCTIONAL
Brand: TENDRIL™

## 2024-07-01 RX ORDER — CEFAZOLIN SODIUM 1 G/3ML
INJECTION, POWDER, FOR SOLUTION INTRAMUSCULAR; INTRAVENOUS PRN
Status: DISCONTINUED | OUTPATIENT
Start: 2024-07-01 | End: 2024-07-01 | Stop reason: SDUPTHER

## 2024-07-01 RX ORDER — PROPOFOL 10 MG/ML
INJECTION, EMULSION INTRAVENOUS PRN
Status: DISCONTINUED | OUTPATIENT
Start: 2024-07-01 | End: 2024-07-01 | Stop reason: SDUPTHER

## 2024-07-01 RX ORDER — SODIUM CHLORIDE 9 MG/ML
INJECTION, SOLUTION INTRAVENOUS CONTINUOUS PRN
Status: DISCONTINUED | OUTPATIENT
Start: 2024-07-01 | End: 2024-07-01 | Stop reason: SDUPTHER

## 2024-07-01 RX ORDER — FENTANYL CITRATE 50 UG/ML
INJECTION, SOLUTION INTRAMUSCULAR; INTRAVENOUS PRN
Status: DISCONTINUED | OUTPATIENT
Start: 2024-07-01 | End: 2024-07-01 | Stop reason: SDUPTHER

## 2024-07-01 RX ORDER — MIDAZOLAM HYDROCHLORIDE 1 MG/ML
INJECTION INTRAMUSCULAR; INTRAVENOUS PRN
Status: DISCONTINUED | OUTPATIENT
Start: 2024-07-01 | End: 2024-07-01 | Stop reason: SDUPTHER

## 2024-07-01 RX ORDER — SODIUM CHLORIDE 0.9 % (FLUSH) 0.9 %
5-40 SYRINGE (ML) INJECTION EVERY 12 HOURS SCHEDULED
Status: DISCONTINUED | OUTPATIENT
Start: 2024-07-01 | End: 2024-07-05 | Stop reason: HOSPADM

## 2024-07-01 RX ORDER — LIDOCAINE HYDROCHLORIDE 10 MG/ML
INJECTION, SOLUTION INFILTRATION; PERINEURAL PRN
Status: DISCONTINUED | OUTPATIENT
Start: 2024-07-01 | End: 2024-07-01 | Stop reason: HOSPADM

## 2024-07-01 RX ORDER — SODIUM CHLORIDE 0.9 % (FLUSH) 0.9 %
5-40 SYRINGE (ML) INJECTION PRN
Status: DISCONTINUED | OUTPATIENT
Start: 2024-07-01 | End: 2024-07-05 | Stop reason: HOSPADM

## 2024-07-01 RX ORDER — VANCOMYCIN HYDROCHLORIDE 1 G/20ML
INJECTION, POWDER, LYOPHILIZED, FOR SOLUTION INTRAVENOUS PRN
Status: DISCONTINUED | OUTPATIENT
Start: 2024-07-01 | End: 2024-07-01 | Stop reason: SDUPTHER

## 2024-07-01 RX ADMIN — SODIUM CHLORIDE: 9 INJECTION, SOLUTION INTRAVENOUS at 07:51

## 2024-07-01 RX ADMIN — PROPOFOL 15 MCG/KG/MIN: 10 INJECTION, EMULSION INTRAVENOUS at 08:30

## 2024-07-01 RX ADMIN — CEFAZOLIN 2 G: 1 INJECTION, POWDER, FOR SOLUTION INTRAMUSCULAR; INTRAVENOUS at 08:13

## 2024-07-01 RX ADMIN — SODIUM CHLORIDE: 9 INJECTION, SOLUTION INTRAVENOUS at 08:12

## 2024-07-01 RX ADMIN — SODIUM CHLORIDE, PRESERVATIVE FREE 10 ML: 5 INJECTION INTRAVENOUS at 09:59

## 2024-07-01 RX ADMIN — VANCOMYCIN HYDROCHLORIDE 1000 MG: 1 INJECTION, POWDER, LYOPHILIZED, FOR SOLUTION INTRAVENOUS at 08:12

## 2024-07-01 RX ADMIN — ACETAMINOPHEN 650 MG: 325 TABLET ORAL at 14:12

## 2024-07-01 RX ADMIN — FENTANYL CITRATE 50 MCG: 50 INJECTION, SOLUTION INTRAMUSCULAR; INTRAVENOUS at 08:16

## 2024-07-01 RX ADMIN — SODIUM CHLORIDE, PRESERVATIVE FREE 10 ML: 5 INJECTION INTRAVENOUS at 22:00

## 2024-07-01 RX ADMIN — WATER 1000 MG: 1 INJECTION INTRAMUSCULAR; INTRAVENOUS; SUBCUTANEOUS at 18:42

## 2024-07-01 RX ADMIN — SODIUM CHLORIDE, PRESERVATIVE FREE 10 ML: 5 INJECTION INTRAVENOUS at 10:00

## 2024-07-01 RX ADMIN — MIDAZOLAM 1 MG: 1 INJECTION INTRAMUSCULAR; INTRAVENOUS at 08:10

## 2024-07-01 RX ADMIN — SODIUM CHLORIDE, PRESERVATIVE FREE 10 ML: 5 INJECTION INTRAVENOUS at 19:55

## 2024-07-01 RX ADMIN — PROPOFOL 20 MG: 10 INJECTION, EMULSION INTRAVENOUS at 08:15

## 2024-07-01 RX ADMIN — FENTANYL CITRATE 50 MCG: 50 INJECTION, SOLUTION INTRAMUSCULAR; INTRAVENOUS at 08:10

## 2024-07-01 RX ADMIN — PANTOPRAZOLE SODIUM 40 MG: 40 TABLET, DELAYED RELEASE ORAL at 09:59

## 2024-07-01 ASSESSMENT — PAIN DESCRIPTION - DESCRIPTORS: DESCRIPTORS: ACHING;DISCOMFORT;SORE

## 2024-07-01 ASSESSMENT — PAIN SCALES - GENERAL
PAINLEVEL_OUTOF10: 0
PAINLEVEL_OUTOF10: 0
PAINLEVEL_OUTOF10: 3
PAINLEVEL_OUTOF10: 0

## 2024-07-01 ASSESSMENT — PAIN DESCRIPTION - PAIN TYPE: TYPE: SURGICAL PAIN

## 2024-07-01 ASSESSMENT — PAIN DESCRIPTION - LOCATION: LOCATION: SHOULDER

## 2024-07-01 ASSESSMENT — PAIN DESCRIPTION - FREQUENCY: FREQUENCY: INTERMITTENT

## 2024-07-01 ASSESSMENT — ENCOUNTER SYMPTOMS
SHORTNESS OF BREATH: 1
DYSPNEA ACTIVITY LEVEL: AFTER AMBULATING 1 FLIGHT OF STAIRS

## 2024-07-01 ASSESSMENT — PAIN DESCRIPTION - ONSET: ONSET: GRADUAL

## 2024-07-01 ASSESSMENT — PAIN - FUNCTIONAL ASSESSMENT: PAIN_FUNCTIONAL_ASSESSMENT: PREVENTS OR INTERFERES SOME ACTIVE ACTIVITIES AND ADLS

## 2024-07-01 ASSESSMENT — PAIN DESCRIPTION - ORIENTATION: ORIENTATION: LEFT

## 2024-07-01 NOTE — CARE COORDINATION
7/1 Care Coordination:Pt presented to the emergency room complaining of syncope.She was admitted to the hospital, soon thereafter had a third-degree heart block, had emergent temporary pacemaker placement and admitted to [facility]. EP consult, Plan for Perm Pacer. PTA pt  lives in 2 story home alone. Her PCP is Dr Taylor She owns a FWW and a cane. No HHC or RAYA History She has been active PTA. Pt states her plan is home with help from family. No needs at this time from CM.  CM/SW will continue to follow for discharge planning.   Gennaro LANDEROSN,RN--BC  [phone]

## 2024-07-01 NOTE — PLAN OF CARE
Problem: Discharge Planning  Goal: Discharge to home or other facility with appropriate resources  Outcome: Progressing     Problem: Skin/Tissue Integrity  Goal: Absence of new skin breakdown  Description: 1.  Monitor for areas of redness and/or skin breakdown  2.  Assess vascular access sites hourly  3.  Every 4-6 hours minimum:  Change oxygen saturation probe site  4.  Every 4-6 hours:  If on nasal continuous positive airway pressure, respiratory therapy assess nares and determine need for appliance change or resting period.  Outcome: Progressing     Problem: Cardiovascular - Adult  Goal: Maintains optimal cardiac output and hemodynamic stability  Outcome: Progressing  Goal: Absence of cardiac dysrhythmias or at baseline  Outcome: Progressing     Problem: Safety - Adult  Goal: Free from fall injury  Outcome: Progressing     Problem: ABCDS Injury Assessment  Goal: Absence of physical injury  Outcome: Progressing     Problem: Pain  Goal: Verbalizes/displays adequate comfort level or baseline comfort level  Outcome: Progressing  Flowsheets  Taken 7/1/2024 1500  Verbalizes/displays adequate comfort level or baseline comfort level: Assess pain using appropriate pain scale  Taken 7/1/2024 1412  Verbalizes/displays adequate comfort level or baseline comfort level:   Assess pain using appropriate pain scale   Administer analgesics based on type and severity of pain and evaluate response  Taken 7/1/2024 1200  Verbalizes/displays adequate comfort level or baseline comfort level: Assess pain using appropriate pain scale  Taken 7/1/2024 1000  Verbalizes/displays adequate comfort level or baseline comfort level:   Encourage patient to monitor pain and request assistance   Assess pain using appropriate pain scale

## 2024-07-01 NOTE — PROGRESS NOTES
Internal Medicine Progress Note    Patient's name: Shante Ragsdale  : 1940  Chief complaints (on day of admission): Loss of Consciousness (Ems was called for a fall. Pt fell back and ems reports asystole. 1min cpr performed and pt now awake and alert)  Admission date: 2024  Date of service: 2024   Room: [address]  Primary care physician: Taisha Taylor DO  Reason for visit: Follow-up for LOC     Subjective  Shante BOSTON was not seen by me today, was out of ICU and down for PPM placement. Please call with any issues I will follow the chart thank you.     Review of Systems not obtained today   There are no new complaints of chest pain, shortness of breath, abdominal pain, nausea, vomiting, diarrhea, constipation unless otherwise mentioned above.     Hospital Medications  Current Facility-Administered Medications   Medication Dose Route Frequency Provider Last Rate Last Admin    sodium chloride flush 0.9 % injection 5-40 mL  5-40 mL IntraVENous 2 times per day Angella Briceño MD   10 mL at 24 1000    sodium chloride flush 0.9 % injection 5-40 mL  5-40 mL IntraVENous PRN Angella Briceño MD        ceFAZolin (ANCEF) 1,000 mg in sterile water 10 mL IV syringe  1,000 mg IntraVENous Q8H Angella Briceño MD        pantoprazole (PROTONIX) tablet 40 mg  40 mg Oral QAM AC Angella Briceño MD   40 mg at 24 0959    sodium chloride flush 0.9 % injection 5-40 mL  5-40 mL IntraVENous 2 times per day Angella Briceño MD   10 mL at 24 0959    sodium chloride flush 0.9 % injection 5-40 mL  5-40 mL IntraVENous PRN Angella Briceño MD        0.9 % sodium chloride infusion   IntraVENous PRN Angella Briceño MD        potassium chloride (KLOR-CON M) extended release tablet 40 mEq  40 mEq Oral PRN Angella Briceño MD        Or    potassium bicarb-citric acid (EFFER-K) effervescent tablet 40 mEq  40 mEq Oral PRN Angella Briceño MD        Or    potassium chloride 10 mEq/100 mL IVPB (Peripheral Line)  10 mEq IntraVENous PRN Keyanna

## 2024-07-01 NOTE — CARE COORDINATION
Case Management Assessment  Initial Evaluation    Date/Time of Evaluation: 7/1/2024 2:50 PM  Assessment Completed by: Gennaro Rush RN    If patient is discharged prior to next notation, then this note serves as note for discharge by case management.    Patient Name: Shante Ragsdale                   YOB: 1940  Diagnosis: Syncope and collapse [R55]  Heart block [I45.9]  NSTEMI (non-ST elevated myocardial infarction) (HCC) [I21.4]  Fall from bed, subsequent encounter [W06.XXXD]  S/P placement of cardiac pacemaker [Z95.0]                   Date / Time: 6/29/2024  1:28 PM    Patient Admission Status: Inpatient   Readmission Risk (Low < 19, Mod (19-27), High > 27): Readmission Risk Score: 11.8    Current PCP: Taisha Taylor, DO  PCP verified by CM? Yes    Chart Reviewed: Yes      History Provided by: Patient  Patient Orientation: Alert and Oriented    Patient Cognition: Alert    Hospitalization in the last 30 days (Readmission):  Yes    If yes, Readmission Assessment in CM Navigator will be completed.    Advance Directives:      Code Status: Full Code   Patient's Primary Decision Maker is: Legal Next of Kin    Primary Decision Maker: Jada Ragsdale - Daughter-in-Law - [phone]    Secondary Decision Maker: Sudha Ragsdale - Grandchild - [phone]    Discharge Planning:    Patient lives with: Alone Type of Home: House  Primary Care Giver: Self  Patient Support Systems include: Family Members   Current Financial resources:    Current community resources:    Current services prior to admission: None            Current DME:              Type of Home Care services:  None    ADLS  Prior functional level: Independent in ADLs/IADLs  Current functional level: Independent in ADLs/IADLs    PT AM-PAC:   /24  OT AM-PAC:   /24    Family can provide assistance at DC: Yes  Would you like Case Management to discuss the discharge plan with any other family members/significant others, and if so, who? No (No need.)  Plans

## 2024-07-01 NOTE — ACP (ADVANCE CARE PLANNING)
Advance Care Planning   Healthcare Decision Maker:    Primary Decision Maker: Jada Ragsdale - Daughter-in-Law - [phone]    Secondary Decision Maker: Sudha Ragsdale - Grandchild - [phone]    Click here to complete Healthcare Decision Makers including selection of the Healthcare Decision Maker Relationship (ie \"Primary\").

## 2024-07-01 NOTE — PLAN OF CARE
Problem: Discharge Planning  Goal: Discharge to home or other facility with appropriate resources  7/1/2024 1912 by Jerome Le RN  Outcome: Progressing     Problem: Skin/Tissue Integrity  Goal: Absence of new skin breakdown  Description: 1.  Monitor for areas of redness and/or skin breakdown  2.  Assess vascular access sites hourly  3.  Every 4-6 hours minimum:  Change oxygen saturation probe site  4.  Every 4-6 hours:  If on nasal continuous positive airway pressure, respiratory therapy assess nares and determine need for appliance change or resting period.  7/1/2024 1912 by Jerome Le RN  Outcome: Progressing     Problem: Cardiovascular - Adult  Goal: Maintains optimal cardiac output and hemodynamic stability  7/1/2024 1912 by Jerome Le RN  Outcome: Progressing     Problem: Cardiovascular - Adult  Goal: Absence of cardiac dysrhythmias or at baseline  7/1/2024 1912 by Jerome Le RN  Outcome: Progressing     Problem: Safety - Adult  Goal: Free from fall injury  7/1/2024 1912 by Jerome Le RN  Outcome: Progressing     Problem: ABCDS Injury Assessment  Goal: Absence of physical injury  7/1/2024 1912 by Jerome Le RN  Outcome: Progressing     Problem: Pain  Goal: Verbalizes/displays adequate comfort level or baseline comfort level  7/1/2024 1912 by Jerome Le RN  Outcome: Progressing

## 2024-07-01 NOTE — DISCHARGE INSTRUCTIONS
[facility] Pacemaker Instructions    Medications: No medication changes per electrophysiology, but see other providers for additional instructions.    Incision Care:  Brown Aquacel dressing: Located over your incision. Remove in 1 week,  Monday July 8th.   Surgical glue: Located under the brown dressings and over your incision. This glue is there to prevent infection. Do NOT scrub, itch, pick, or remove the glue as this could lead to infection. The glue will fall off on its own over the next 6 weeks.  Daily monitoring: Check incision sites on chest daily. Notify the office at [phone] if you develop any redness, swelling, drainage, warmth, or fever greater than 100 degrees.     Bathing:  Keep the incision dry. You may shower tomorrow evening after you remove the white pressure dressing, but do NOT spray the water directly at the site or scrub at the site. Pat dry only with a clean towel. No soaking in a bathtub, hot tub, or pool for 6 weeks.     Activity:  You may resume normal activity with left arm restrictions.  Arm restrictions:  Arm immobilizer: Wear the arm immobilizer at all times for 48 hours except to shower, toilet, and eat. After 48 hours, wear the arm immobilizer at night for the next 4 weeks. After 4 weeks you do not need to wear the arm immobilizer anymore. The immobilizer should be loose, not tight in order to avoid restriction of blood flow.  Avoid pulling yourself up with your arm, pushing or stretching motions.   Do not raise left elbow higher than shoulder height and do not lift anything weighing more than 3 pounds for 6 weeks.    Do not do any activities such as golfing, vacuuming, or mowing the lawn for 6 weeks.    Prevent any hard blows to the pacemaker.      Driving: You may drive, if you feel up to it, in 14 days, or after your follow up appointment. Start with local/short trips to familiar places. Avoid highway/ high-speed driving for the first few days after you resume  like a 's license or credit card and should arrive within 8 weeks.  Carry your ID card with you at all times    Follow Up:  Device Clinic: You will be seen on Tuesday 7/16/24 at 1:00 pm at the Device Clinic for  incision check and brief Pacemaker evaluation.  If you need to reschedule this appointment, call the office at [phone].  Dr. Lin Office: You will be seen on Friday 10/4/24 at 10:00 am for a follow up appointment with the nurse practitioner. If you are not contacted within 10 business days, please call the office at [phone].    [facility]  [address] [place]  [address]  [phone]

## 2024-07-01 NOTE — ANESTHESIA POSTPROCEDURE EVALUATION
Department of Anesthesiology  Postprocedure Note    Patient: Shante Ragsdale  MRN: [id number]  YOB: 1940  Date of evaluation: 7/1/2024    Procedure Summary       Date: 07/01/24 [place] / [place] [facility]    Anesthesia Start: 0740 Anesthesia Stop: 0932    Procedure: Insert PPM dual Diagnosis:       Syncope and collapse      (Complete heart block  Syncope)    Providers: Angella Briceño MD Responsible Provider: Jennifer Leal MD    Anesthesia Type: MAC ASA Status: 4            Anesthesia Type: No value filed.    Harris Phase I:      Harris Phase II:      Anesthesia Post Evaluation    Patient location during evaluation: PACU  Patient participation: complete - patient participated  Level of consciousness: awake  Pain score: 0  Airway patency: patent  Nausea & Vomiting: no nausea  Cardiovascular status: hemodynamically stable  Respiratory status: acceptable  Hydration status: stable  Multimodal analgesia pain management approach    There were no known notable events for this encounter.

## 2024-07-01 NOTE — ANESTHESIA PRE PROCEDURE
Summary          Left Ventricle: Normal left ventricular systolic function with a visually estimated EF of 55 - 60%. Left ventricle size is normal. Normal wall thickness. Normal wall motion. Normal diastolic function.    Aortic Valve: Mild regurgitation with a centrally directed jet.    Mitral Valve: Mild regurgitation with a centrally directed jet.    Tricuspid Valve: Mild regurgitation with a centrally directed jet.    Image quality is good.       Anesthesia Evaluation  Patient summary reviewed and Nursing notes reviewed   no history of anesthetic complications:   Airway: Mallampati: II  TM distance: >3 FB   Neck ROM: limited  Comment: Limited ROM neck d/t TVP drsg, occipital soreness 2* fall  Mouth opening: > = 3 FB   Dental: normal exam         Pulmonary:normal exam  breath sounds clear to auscultation  (+)   shortness of breath: chronic,                                    Cardiovascular:  Exercise tolerance: good (>4 METS)  (+) hypertension:, valvular problems/murmurs: AI and MR, pacemaker (TVP): pacemaker, past MI (witnessed arrest w CPR): < 1 month, dysrhythmias (Intermittent complete heart block):, LOPES: after ambulating 1 flight of stairs, hyperlipidemia        Rhythm: regular  Rate: normal                   PE comment: SR   Neuro/Psych:                ROS comment: Cervical spondylosis, lumbar stenosis  Syncope and collapse GI/Hepatic/Renal:   (+) GERD: well controlled          Endo/Other:              Pt had no PAT visit        ROS comment: L TKA Abdominal: normal exam      Abdomen: soft.      Vascular: negative vascular ROS.         Other Findings:             Anesthesia Plan      MAC     ASA 4       Induction: intravenous.  continuous noninvasive hemodynamic monitor    Anesthetic plan and risks discussed with patient.    Use of blood products discussed with patient whom consented to blood products.    Plan discussed with attending.                    Omar Allison, APRN - CRNA   7/1/2024

## 2024-07-02 ENCOUNTER — APPOINTMENT (OUTPATIENT)
Dept: GENERAL RADIOLOGY | Age: 84
End: 2024-07-02
Payer: MEDICARE

## 2024-07-02 LAB
ANION GAP SERPL CALCULATED.3IONS-SCNC: 11 MMOL/L (ref 7–16)
BASOPHILS # BLD: 0.04 K/UL (ref 0–0.2)
BASOPHILS NFR BLD: 0 % (ref 0–2)
BUN SERPL-MCNC: 19 MG/DL (ref 6–23)
CALCIUM SERPL-MCNC: 8.4 MG/DL (ref 8.6–10.2)
CHLORIDE SERPL-SCNC: 100 MMOL/L (ref 98–107)
CO2 SERPL-SCNC: 24 MMOL/L (ref 22–29)
CREAT SERPL-MCNC: 0.7 MG/DL (ref 0.5–1)
EOSINOPHIL # BLD: 0.1 K/UL (ref 0.05–0.5)
EOSINOPHILS RELATIVE PERCENT: 1 % (ref 0–6)
ERYTHROCYTE [DISTWIDTH] IN BLOOD BY AUTOMATED COUNT: 14 % (ref 11.5–15)
GFR, ESTIMATED: 79 ML/MIN/1.73M2
GLUCOSE BLD-MCNC: 134 MG/DL (ref 74–99)
GLUCOSE SERPL-MCNC: 107 MG/DL (ref 74–99)
HCT VFR BLD AUTO: 33.8 % (ref 34–48)
HGB BLD-MCNC: 11.2 G/DL (ref 11.5–15.5)
IMM GRANULOCYTES # BLD AUTO: 0.04 K/UL (ref 0–0.58)
IMM GRANULOCYTES NFR BLD: 0 % (ref 0–5)
LYMPHOCYTES NFR BLD: 1.77 K/UL (ref 1.5–4)
LYMPHOCYTES RELATIVE PERCENT: 19 % (ref 20–42)
MCH RBC QN AUTO: 28.5 PG (ref 26–35)
MCHC RBC AUTO-ENTMCNC: 33.1 G/DL (ref 32–34.5)
MCV RBC AUTO: 86 FL (ref 80–99.9)
MONOCYTES NFR BLD: 0.86 K/UL (ref 0.1–0.95)
MONOCYTES NFR BLD: 9 % (ref 2–12)
NEUTROPHILS NFR BLD: 70 % (ref 43–80)
NEUTS SEG NFR BLD: 6.42 K/UL (ref 1.8–7.3)
PLATELET # BLD AUTO: 324 K/UL (ref 130–450)
PMV BLD AUTO: 8.9 FL (ref 7–12)
POTASSIUM SERPL-SCNC: 3.5 MMOL/L (ref 3.5–5)
RBC # BLD AUTO: 3.93 M/UL (ref 3.5–5.5)
SODIUM SERPL-SCNC: 135 MMOL/L (ref 132–146)
WBC OTHER # BLD: 9.2 K/UL (ref 4.5–11.5)

## 2024-07-02 PROCEDURE — 71045 X-RAY EXAM CHEST 1 VIEW: CPT

## 2024-07-02 PROCEDURE — 80048 BASIC METABOLIC PNL TOTAL CA: CPT

## 2024-07-02 PROCEDURE — 82962 GLUCOSE BLOOD TEST: CPT

## 2024-07-02 PROCEDURE — 93005 ELECTROCARDIOGRAM TRACING: CPT | Performed by: STUDENT IN AN ORGANIZED HEALTH CARE EDUCATION/TRAINING PROGRAM

## 2024-07-02 PROCEDURE — 2580000003 HC RX 258: Performed by: INTERNAL MEDICINE

## 2024-07-02 PROCEDURE — 1200000000 HC SEMI PRIVATE

## 2024-07-02 PROCEDURE — 6360000002 HC RX W HCPCS: Performed by: INTERNAL MEDICINE

## 2024-07-02 PROCEDURE — 2580000003 HC RX 258: Performed by: STUDENT IN AN ORGANIZED HEALTH CARE EDUCATION/TRAINING PROGRAM

## 2024-07-02 PROCEDURE — 97166 OT EVAL MOD COMPLEX 45 MIN: CPT

## 2024-07-02 PROCEDURE — 6370000000 HC RX 637 (ALT 250 FOR IP): Performed by: INTERNAL MEDICINE

## 2024-07-02 PROCEDURE — 97162 PT EVAL MOD COMPLEX 30 MIN: CPT

## 2024-07-02 PROCEDURE — 99291 CRITICAL CARE FIRST HOUR: CPT | Performed by: STUDENT IN AN ORGANIZED HEALTH CARE EDUCATION/TRAINING PROGRAM

## 2024-07-02 PROCEDURE — 97530 THERAPEUTIC ACTIVITIES: CPT

## 2024-07-02 PROCEDURE — 97535 SELF CARE MNGMENT TRAINING: CPT

## 2024-07-02 PROCEDURE — 85025 COMPLETE CBC W/AUTO DIFF WBC: CPT

## 2024-07-02 RX ORDER — SODIUM CHLORIDE 9 MG/ML
INJECTION, SOLUTION INTRAVENOUS CONTINUOUS
Status: ACTIVE | OUTPATIENT
Start: 2024-07-02 | End: 2024-07-02

## 2024-07-02 RX ADMIN — SODIUM CHLORIDE, PRESERVATIVE FREE 10 ML: 5 INJECTION INTRAVENOUS at 21:55

## 2024-07-02 RX ADMIN — POTASSIUM BICARBONATE 40 MEQ: 782 TABLET, EFFERVESCENT ORAL at 06:12

## 2024-07-02 RX ADMIN — SODIUM CHLORIDE, PRESERVATIVE FREE 10 ML: 5 INJECTION INTRAVENOUS at 09:44

## 2024-07-02 RX ADMIN — ACETAMINOPHEN 650 MG: 325 TABLET ORAL at 00:28

## 2024-07-02 RX ADMIN — PANTOPRAZOLE SODIUM 40 MG: 40 TABLET, DELAYED RELEASE ORAL at 06:13

## 2024-07-02 RX ADMIN — SODIUM CHLORIDE: 9 INJECTION, SOLUTION INTRAVENOUS at 12:11

## 2024-07-02 RX ADMIN — SODIUM CHLORIDE: 9 INJECTION, SOLUTION INTRAVENOUS at 23:54

## 2024-07-02 RX ADMIN — WATER 1000 MG: 1 INJECTION INTRAMUSCULAR; INTRAVENOUS; SUBCUTANEOUS at 09:44

## 2024-07-02 RX ADMIN — WATER 1000 MG: 1 INJECTION INTRAMUSCULAR; INTRAVENOUS; SUBCUTANEOUS at 01:55

## 2024-07-02 RX ADMIN — ONDANSETRON 4 MG: 2 INJECTION INTRAMUSCULAR; INTRAVENOUS at 10:05

## 2024-07-02 ASSESSMENT — PAIN SCALES - GENERAL
PAINLEVEL_OUTOF10: 0
PAINLEVEL_OUTOF10: 0
PAINLEVEL_OUTOF10: 3
PAINLEVEL_OUTOF10: 0
PAINLEVEL_OUTOF10: 1

## 2024-07-02 ASSESSMENT — PAIN DESCRIPTION - DESCRIPTORS: DESCRIPTORS: DISCOMFORT

## 2024-07-02 ASSESSMENT — PAIN DESCRIPTION - ORIENTATION: ORIENTATION: LEFT

## 2024-07-02 ASSESSMENT — PAIN DESCRIPTION - LOCATION: LOCATION: INCISION

## 2024-07-02 NOTE — PROGRESS NOTES
Patient had a syncopal episode while up in chair. Patient placed back in bed by staff. VSS. Transfer order canceled and stat chest xray ordered by Dr. Lin.

## 2024-07-02 NOTE — PROGRESS NOTES
Internal Medicine Progress Note    Patient's name: Shante Ragsdale  : 1940  Chief complaints (on day of admission): Loss of Consciousness (Ems was called for a fall. Pt fell back and ems reports asystole. 1min cpr performed and pt now awake and alert)  Admission date: 2024  Date of service: 2024   Room: [address]  Primary care physician: Taisha Taylor DO  Reason for visit: Follow-up for LOC     Subjective  Shante BOSTON was seen and examined     A very nice woman  No pain at this time   BP stable   Pacer in place   Had an episode of syncope today when she stood this was her first time up in some days   Spoke with nursing   Patient to remain in SICU today   Watch Bps  Encourage PO intake   Consider gentle fluids     Review of Systems   There are no new complaints of chest pain, shortness of breath, abdominal pain, nausea, vomiting, diarrhea, constipation unless otherwise mentioned above.     Hospital Medications  Current Facility-Administered Medications   Medication Dose Route Frequency Provider Last Rate Last Admin    0.9 % sodium chloride infusion   IntraVENous Continuous Zana Lin  mL/hr at 24 1211 New Bag at 24 1211    sodium chloride flush 0.9 % injection 5-40 mL  5-40 mL IntraVENous 2 times per day Angella Briceño MD   10 mL at 24 0944    sodium chloride flush 0.9 % injection 5-40 mL  5-40 mL IntraVENous PRN Angella Briceño MD        pantoprazole (PROTONIX) tablet 40 mg  40 mg Oral QAM AC Angella Briceño MD   40 mg at 24 0613    sodium chloride flush 0.9 % injection 5-40 mL  5-40 mL IntraVENous 2 times per day Angella Briceño MD   10 mL at 24 0944    sodium chloride flush 0.9 % injection 5-40 mL  5-40 mL IntraVENous PRN Angella Briceño MD        0.9 % sodium chloride infusion   IntraVENous PRN Angella Briceño MD        potassium chloride (KLOR-CON M) extended release tablet 40 mEq  40 mEq Oral PRN Angella Briceño MD        Or    potassium bicarb-citric acid  Results   Component Value Date    WBC 9.2 07/02/2024    HGB 11.2 (L) 07/02/2024    HCT 33.8 (L) 07/02/2024     07/02/2024     07/02/2024    K 3.5 07/02/2024     07/02/2024    CREATININE 0.7 07/02/2024    BUN 19 07/02/2024    CO2 24 07/02/2024    GLUCOSE 107 (H) 07/02/2024    ALT 23 06/29/2024    AST 28 06/29/2024    APTT 23.9 (L) 06/29/2024    TSH 0.05 (L) 06/12/2024       XR CHEST PORTABLE   Final Result   1. No acute cardiopulmonary disease.   2. Dual-chamber cardiac pacemaker in place. No evidence of a pneumothorax.         XR CHEST PORTABLE   Final Result   No acute process.      No pneumothorax post pacemaker placement.      Borderline cardiomegaly.         CT CHEST W CONTRAST   Final Result   1. No signs of an acute cardiopulmonary process.   2. Large fixed hiatus hernia.   3. There is fluid within the esophagus that suggests signs of significant   reflux.   4. Slight fullness of the intrahepatic ducts and correlation with biliary   enzymes may be helpful to determine the significance of this finding.         CT CSpine W/O Contrast   Final Result   1. There is no acute compression fracture or subluxation of the cervical   spine.   2. Multilevel degenerative disc and degenerative joint disease.         CT Head W/O Contrast   Final Result   1.  There is no acute intracranial abnormality.  Specifically, there is no   intracranial hemorrhage.   2. Atrophy and periventricular microvascular ischemic disease,   3. Old lacunar infarct within the left basal ganglia.   .         XR CHEST PORTABLE   Final Result   No acute process.         XR ANKLE LEFT (MIN 3 VIEWS)   Final Result   No radiographic evidence for fracture.      RECOMMENDATION:   Short-term follow-up radiographs in 7-10 days or cross-sectional imaging   (CT/MRI) if there is persistent concern for fracture or other traumatic   process or the symptoms persist.             Echocardiogram       Assessment   Active Hospital Problems

## 2024-07-02 NOTE — PROGRESS NOTES
[facility] CARDIOLOGY  CARDIAC ELECTROPHYSIOLOGY DEPARTMENT/DIVISION OF CARDIOLOGY  Inpatient progress report  PATIENT: Shante Ragsdale  MEDICAL RECORD NUMBER: [id number]  DATE OF SERVICE:  7/2/2024  ATTENDING ELECTROPHYSIOLOGIST:  Zana Lin DO   REFERRING PHYSICIAN: No ref. provider found and Taisha Taylor DO  CHIEF COMPLAINT: Syncope    HPI: Shante Ragsdale is a 84 y.o. female with a history of RBBB, moderate AI, HTN, spinal stenosis, and GERD.  She is managed by [facility] cardiology with amlodipine 10 mg daily, Imdur 30 mg daily, and PPI.  In 2023 or earlier, patient was diagnosed with RBBB and moderate AI.  In 6/2024, patient presented with syncope.  She remained in sinus throughout the admission and TTE was grossly normal.  She was discharged and was to have event monitor via her cardiologist office.  On 6/30/2024, patient presented with chief complaint of syncope while picking berries in her driveway.  In the ED, she was diagnosed with AV block.  On 7/1/2024, [employer] dual-chamber pacemaker was implanted by Dr. Briceño.    7/2/2024: Patient had episode of vasovagal syncope during antibiotic ministration.  Episode described as nausea and diaphoresis followed by LOC.  Device interrogation revealed stable device function.  Telemetry during the event revealed no dysrhythmias.  She was noted to have brief hypotension at the time of the event that resolved without intervention.  No complaints this time.  Stable device function.  Chest x-ray with stable lead position and no pneumothorax.    Prior cardiac testing:  - TTE (6/13/2024): LVEF = 55-60%, mild AI, mild MR, mild TR.  - TTE (11/17/2023 at [facility]): LVEF = 55%, mild LVH, mild AS, moderate AI, mild MR, mild TR.    No past medical history on file.  Past Surgical History:   Procedure Laterality Date    APPENDECTOMY      BACK SURGERY      CARDIAC PROCEDURE N/A 6/29/2024    Insert temporary pacemaker performed by Stanford Shah MD at

## 2024-07-02 NOTE — PROGRESS NOTES
OCCUPATIONAL THERAPY INITIAL EVALUATION    [facility]  [address], [place]       Date:2024                                                               Patient Name: Shante Ragsdale  MRN: [id number]  : 1940  Room: [address] [place] [address]    Evaluating OT: Mckenzie Diallo, OTR/L [id number]    Referring Provider:   Authorizing   Jason Valente MD      Specific Provider Orders/Date: OT eval and treat (24)        Diagnosis: Syncope and collapse [R55]  Heart block [I45.9]  NSTEMI (non-ST elevated myocardial infarction) (HCC) [I21.4]  Fall from bed, subsequent encounter [W06.XXXD]  S/P placement of cardiac pacemaker [Z95.0]      Reason for admission; 84-year-old  female who presented to the emergency room complaining of syncope. She has syncopal episode. EMS reports asystole. CPR was performed. She was seen in the emergency room at which time she was in sinus rhythm with first-degree heart block.        Surgery/Procedures:        TVP      PPM       Pertinent Medical History: recurrent syncope   No past medical history on file.       *Precautions:  Fall Risk, pacemaker, [place]    Assessment of current deficits   [x] Functional mobility  [x]ADLs  [x] Strength               []Cognition   [x] Functional transfers   [x] IADLs         [x] Safety Awareness   [x]Endurance   [] Fine Coordination        [x] ROM     [] Vision/perception   []Sensation    []Gross Motor Coordination [x] Balance   [] Delirium                  []Motor Control     [] Communication    OT PLAN OF CARE   OT POC based on physician orders, patient diagnosis and results of clinical assessment.       Frequency/Duration: 1-3 days/wk for 1-2 weeks PRN    Specific OT Treatment to include:   ADL retraining/adapted techniques and AE recommendations to increase functional independence within precautions                    Energy conservation techniques to improve tolerance for  selfcare routine   Functional transfer/mobility training/DME recommendations for increased independence, safety and fall prevention         Patient/family education to increase safety and functional independence within precautions              Environmental modifications for safe mobility and completion of ADLs                                             Therapeutic activity to improve functional performance during ADLs/IADLs                                         Therapeutic exercise to improve tolerance and functional strength for ADLs   Balance retraining exercises/tasks for facilitation of postural control with dynamic challenges during ADLs .  Positioning to improve functional independence   Delirium prevention/treatment    Recommended Adaptive Equipment: TBA     Home Living: Pt lives alone  in a 2 floor plan with bed/bath on first floor; 1 step(s) to enter and no rail(s);  Walk in shower in basement  Bathroom setup: tub/shower first floor  Equipment owned: shower seat, WW, cane     Prior Level of Function: IND with ADLs;  IND with IADLs.   Cane for ambulation outdoors.   Driving: yes  Occupation: very active: mows lawn, enjoys Bingo and going on walks    Pain Level: pt c/o 0/10  pain  this session    Cognition: A&O: 4/4    Follows 1-2 step commands appropriately.    Memory: good   Comprehension good   Problem solving: fair   Judgement/safety: fair               Communication skills: WFL           Vision: WFL               Glasses:yes                                                   Hearing: min [place]     RASS: 0  CAM-ICU: (NT) Delirium    UE Assessment:  Hand Dominance: Right [x]  Left []   *reports h/o B shoulder RTC repairs    ROM Strength STM goal: PRN   RUE  WFL   4/5      LUE Immobilized by sling; pacemaker precautions reviewed NT WFL for ADLs & tranfers; good knowledge of precautions        Sensation: No  c/o numbness/tingling in extremities   Tone: WNL   Edema: WFL     Functional Assessment:  AM-PAC

## 2024-07-02 NOTE — PROGRESS NOTES
Physical Therapy  Physical Therapy Initial Assessment     Name: Shante Ragsdale  : 1940  MRN: [id number]      Date of Service: 2024    Evaluating PT:  Stoney Lopez PT, DPT [id number]    Room #:  3815/3815-A  Diagnosis:  Syncope and collapse [R55]  Heart block [I45.9]  NSTEMI (non-ST elevated myocardial infarction) (HCC) [I21.4]  Fall from bed, subsequent encounter [W06.XXXD]  S/P placement of cardiac pacemaker [Z95.0]  PMHx/PSHx:  No past medical history on file.  Procedure/Surgery:   pacemaker placement  Precautions:  Falls, pacemaker, sling, chair alarm, mild [place]  Equipment Needs:  TBD    SUBJECTIVE:    Pt lives alone in a 2 story home, 1st floor setup, with 1 step(s) to enter and no rail(s).      Pt ambulated with SPC in community and was independent PTA.    OBJECTIVE:   Initial Evaluation  Date: 24 Treatment Short Term/ Long Term   Goals   AM-PAC 6 Clicks      Was pt agreeable to Eval/treatment? Yes     Does pt have pain? No c/o pain     Bed Mobility  Rolling: NT  Supine to sit: ModA with HOB elevated  Sit to supine: NT  Scooting: ModA  Mod Independent    Transfers Sit to stand: Vikas  Stand to sit: Vikas  Stand pivot: Vikas with HHA or SPC  Mod Independent with SPC   Ambulation   40 feet with Vikas with SPC  >400 feet with Mod Independent with SPC   Stair negotiation: ascended and descended NT  >4 steps with 1 rail Mod Independent   ROM BUE:  Defer to OT note  BLE:  WFL     Strength BUE:  Defer to OT note  BLE:  4/5  Increase by 1/3 MMT grade   Balance Sitting EOB:  ModA improving to Vikas  Dynamic Standing:  Vikas with SPC  Sitting EOB:  Independent  Dynamic Standing:  Mod Independent with SPC     Pt is A & O x 4  CAM-ICU: NT  RASS: 0  Sensation:  no reported paresthesias  Edema:  none    Vitals:  Heart Rate at rest 97 bpm Heart Rate post session 103 bpm   SpO2 at rest 96% SpO2 post session 98%   Blood Pressure at rest 167/92 mmHg - pt moving arm Blood Pressure post session 111/85 mmHg   BP in  current medical information, gathering information on past medical history/social history and prior level of function, completion of standardized testing/informal observation of tasks, assessment of data and education on plan of care and goals.    CPT codes:  [] Low Complexity PT evaluation 94959  [x] Moderate Complexity PT evaluation 91141  [] High Complexity PT evaluation 05192  [] PT Re-evaluation 44792  [] Gait training 78455 - minutes  [] Manual therapy 29312 - minutes  [x] Therapeutic activities 81878 9 minutes  [] Therapeutic exercises 32633 - minutes  [] Neuromuscular reeducation 82907 - minutes     Stoney Lopez, PT, DPT  [id number]

## 2024-07-02 NOTE — PLAN OF CARE
Problem: Discharge Planning  Goal: Discharge to home or other facility with appropriate resources  7/2/2024 0819 by Alyssa Osborn, RN  Outcome: Progressing  Discharge to home or other facility with appropriate resources:   Identify barriers to discharge with patient and caregiver   Arrange for needed discharge resources and transportation as appropriate   Identify discharge learning needs (meds, wound care, etc)   Arrange for interpreters to assist at discharge as needed   Refer to discharge planning if patient needs post-hospital services based on physician order or complex needs related to functional status, cognitive ability or social support system     Problem: Cardiovascular - Adult  Goal: Maintains optimal cardiac output and hemodynamic stability  7/2/2024 0819 by Alyssa Osborn, RN  Outcome: Progressing  Flowsheets (Taken 7/2/2024 0819)  Maintains optimal cardiac output and hemodynamic stability:   Administer vasoactive medications as ordered   Administer fluid and/or volume expanders as ordered   Assess for signs of decreased cardiac output   Monitor urine output and notify Licensed Independent Practitioner for values outside of normal range   Monitor blood pressure and heart rate     Problem: Chronic Conditions and Co-morbidities  Goal: Patient's chronic conditions and co-morbidity symptoms are monitored and maintained or improved  Outcome: Progressing  Flowsheets (Taken 7/2/2024 0819)  Care Plan - Patient's Chronic Conditions and Co-Morbidity Symptoms are Monitored and Maintained or Improved:   Update acute care plan with appropriate goals if chronic or comorbid symptoms are exacerbated and prevent overall improvement and discharge   Collaborate with multidisciplinary team to address chronic and comorbid conditions and prevent exacerbation or deterioration   Monitor and assess patient's chronic conditions and comorbid symptoms for stability, deterioration, or improvement

## 2024-07-03 LAB
ANION GAP SERPL CALCULATED.3IONS-SCNC: 17 MMOL/L (ref 7–16)
BASOPHILS # BLD: 0.02 K/UL (ref 0–0.2)
BASOPHILS NFR BLD: 0 % (ref 0–2)
BUN SERPL-MCNC: 11 MG/DL (ref 6–23)
CALCIUM SERPL-MCNC: 8.9 MG/DL (ref 8.6–10.2)
CHLORIDE SERPL-SCNC: 100 MMOL/L (ref 98–107)
CO2 SERPL-SCNC: 21 MMOL/L (ref 22–29)
CREAT SERPL-MCNC: 0.6 MG/DL (ref 0.5–1)
EKG ATRIAL RATE: 91 BPM
EKG P AXIS: 53 DEGREES
EKG P-R INTERVAL: 124 MS
EKG Q-T INTERVAL: 372 MS
EKG QRS DURATION: 126 MS
EKG QTC CALCULATION (BAZETT): 457 MS
EKG R AXIS: 48 DEGREES
EKG T AXIS: 39 DEGREES
EKG VENTRICULAR RATE: 91 BPM
EOSINOPHIL # BLD: 0.01 K/UL (ref 0.05–0.5)
EOSINOPHILS RELATIVE PERCENT: 0 % (ref 0–6)
ERYTHROCYTE [DISTWIDTH] IN BLOOD BY AUTOMATED COUNT: 14 % (ref 11.5–15)
ERYTHROCYTE [DISTWIDTH] IN BLOOD BY AUTOMATED COUNT: 14.1 % (ref 11.5–15)
GFR, ESTIMATED: 88 ML/MIN/1.73M2
GLUCOSE SERPL-MCNC: 121 MG/DL (ref 74–99)
HCT VFR BLD AUTO: 27.5 % (ref 34–48)
HCT VFR BLD AUTO: 33.8 % (ref 34–48)
HGB BLD-MCNC: 11.4 G/DL (ref 11.5–15.5)
HGB BLD-MCNC: 9 G/DL (ref 11.5–15.5)
IMM GRANULOCYTES # BLD AUTO: 0.04 K/UL (ref 0–0.58)
IMM GRANULOCYTES NFR BLD: 0 % (ref 0–5)
LYMPHOCYTES NFR BLD: 0.89 K/UL (ref 1.5–4)
LYMPHOCYTES RELATIVE PERCENT: 9 % (ref 20–42)
MCH RBC QN AUTO: 28.4 PG (ref 26–35)
MCH RBC QN AUTO: 29 PG (ref 26–35)
MCHC RBC AUTO-ENTMCNC: 32.7 G/DL (ref 32–34.5)
MCHC RBC AUTO-ENTMCNC: 33.7 G/DL (ref 32–34.5)
MCV RBC AUTO: 86 FL (ref 80–99.9)
MCV RBC AUTO: 86.8 FL (ref 80–99.9)
MONOCYTES NFR BLD: 0.85 K/UL (ref 0.1–0.95)
MONOCYTES NFR BLD: 9 % (ref 2–12)
NEUTROPHILS NFR BLD: 81 % (ref 43–80)
NEUTS SEG NFR BLD: 7.86 K/UL (ref 1.8–7.3)
PLATELET # BLD AUTO: 210 K/UL (ref 130–450)
PLATELET # BLD AUTO: 261 K/UL (ref 130–450)
PMV BLD AUTO: 9.1 FL (ref 7–12)
PMV BLD AUTO: 9.2 FL (ref 7–12)
POTASSIUM SERPL-SCNC: 3.7 MMOL/L (ref 3.5–5)
RBC # BLD AUTO: 3.17 M/UL (ref 3.5–5.5)
RBC # BLD AUTO: 3.93 M/UL (ref 3.5–5.5)
SODIUM SERPL-SCNC: 138 MMOL/L (ref 132–146)
WBC OTHER # BLD: 12.1 K/UL (ref 4.5–11.5)
WBC OTHER # BLD: 9.7 K/UL (ref 4.5–11.5)

## 2024-07-03 PROCEDURE — 97535 SELF CARE MNGMENT TRAINING: CPT

## 2024-07-03 PROCEDURE — 2580000003 HC RX 258: Performed by: INTERNAL MEDICINE

## 2024-07-03 PROCEDURE — 2140000000 HC CCU INTERMEDIATE R&B

## 2024-07-03 PROCEDURE — 85027 COMPLETE CBC AUTOMATED: CPT

## 2024-07-03 PROCEDURE — 93010 ELECTROCARDIOGRAM REPORT: CPT | Performed by: INTERNAL MEDICINE

## 2024-07-03 PROCEDURE — 2580000003 HC RX 258: Performed by: STUDENT IN AN ORGANIZED HEALTH CARE EDUCATION/TRAINING PROGRAM

## 2024-07-03 PROCEDURE — 6370000000 HC RX 637 (ALT 250 FOR IP): Performed by: INTERNAL MEDICINE

## 2024-07-03 PROCEDURE — 80048 BASIC METABOLIC PNL TOTAL CA: CPT

## 2024-07-03 PROCEDURE — 97530 THERAPEUTIC ACTIVITIES: CPT

## 2024-07-03 PROCEDURE — 85025 COMPLETE CBC W/AUTO DIFF WBC: CPT

## 2024-07-03 RX ORDER — SODIUM CHLORIDE, SODIUM LACTATE, POTASSIUM CHLORIDE, CALCIUM CHLORIDE 600; 310; 30; 20 MG/100ML; MG/100ML; MG/100ML; MG/100ML
INJECTION, SOLUTION INTRAVENOUS CONTINUOUS
Status: ACTIVE | OUTPATIENT
Start: 2024-07-03 | End: 2024-07-04

## 2024-07-03 RX ADMIN — PANTOPRAZOLE SODIUM 40 MG: 40 TABLET, DELAYED RELEASE ORAL at 06:57

## 2024-07-03 RX ADMIN — SODIUM CHLORIDE, POTASSIUM CHLORIDE, SODIUM LACTATE AND CALCIUM CHLORIDE: 600; 310; 30; 20 INJECTION, SOLUTION INTRAVENOUS at 15:07

## 2024-07-03 RX ADMIN — SODIUM CHLORIDE, PRESERVATIVE FREE 10 ML: 5 INJECTION INTRAVENOUS at 20:26

## 2024-07-03 RX ADMIN — SODIUM CHLORIDE, PRESERVATIVE FREE 10 ML: 5 INJECTION INTRAVENOUS at 07:56

## 2024-07-03 RX ADMIN — SODIUM CHLORIDE, PRESERVATIVE FREE 10 ML: 5 INJECTION INTRAVENOUS at 07:55

## 2024-07-03 ASSESSMENT — PAIN SCALES - GENERAL
PAINLEVEL_OUTOF10: 0

## 2024-07-03 NOTE — CARE COORDINATION
7/3 Care Coordination: pt remains in CVIC,AV block. On 7/1/2024, [employer] dual-chamber pacemaker was implanted On 7/2 Pt with episode syncope with LOC. Pt with Ortho static Hypotension.Pt today still po. Orthostatic BP's. Will hold discharge.PT/OT to eval. Pt still wants to go home. Pt was a Max Asst x 2 with Therapy. CM try to discuss RAYA with pt. She was refusing. States her Grand daughter will help her. CM spoke with pt's Jada Schafer. Explained situation, Therapy eval and pt still wanting to go home. Jada states She and her Grad daughter will talk with her. No one can be with her 24/7. And if needs Max assist no way can take her home. They are coming to the Hospital and will contact CM when they get her.  CM/SW will continue to follow for discharge planning.   Gennaro JARRETT,RN--BC  [phone]

## 2024-07-03 NOTE — CARE COORDINATION
7/3 Care Coordination: Pt's family Jada is here. Pt and Jada are reviewing RAYA list for [facility]. Will place referral once choice is made. CM/SW will continue to follow for discharge planning.   Gennaro JARRETT,RN-BARON-BC  [phone]       7/3 Care Coordination: RAYA Choice [facility] no beds, [facility], Spoke with Monika, Referral given. Will have bed Friday. Will need Precert. PASRR complete, Envelope in Soft Chart. ALEXIA started.Facility may be able to transport.  CM/SW will continue to follow for discharge planning.   Gennaro JARRETT,RN-BARON-BC  [phone]

## 2024-07-03 NOTE — PROGRESS NOTES
4 Eyes Skin Assessment     NAME:  Shante Ragsdale  YOB: 1940  MEDICAL RECORD NUMBER:  [id number]    The patient is being assessed for  Transfer to New Unit    I agree that at least one RN has performed a thorough Head to Toe Skin Assessment on the patient. ALL assessment sites listed below have been assessed.      Areas assessed by both nurses:    Head, Face, Ears, Shoulders, Back, Chest, Arms, Elbows, Hands, Sacrum. Buttock, Coccyx, Ischium, and Legs. Feet and Heels        Does the Patient have a Wound? Yes wound(s) were present on assessment. LDA wound assessment was Initiated and completed by RN       [place] Prevention initiated by RN: Yes  Wound Care Orders initiated by RN: Yes    Pressure Injury (Stage 3,4, Unstageable, DTI, NWPT, and Complex wounds) if present, place Wound referral order by RN under : No    New Ostomies, if present place, Ostomy referral order under : No     Nurse 1 eSignature: Electronically signed by Lisseth Martino RN on 7/3/24 at 6:43 PM EDT    **SHARE this note so that the co-signing nurse can place an eSignature**    Nurse 2 eSignature: Electronically signed by Yolanda Delgado RN on 7/3/24 at 7:04 PM EDT

## 2024-07-03 NOTE — PLAN OF CARE
Problem: Discharge Planning  Goal: Discharge to home or other facility with appropriate resources  Outcome: Progressing     Problem: Skin/Tissue Integrity  Goal: Absence of new skin breakdown  Description: 1.  Monitor for areas of redness and/or skin breakdown  2.  Assess vascular access sites hourly  3.  Every 4-6 hours minimum:  Change oxygen saturation probe site  4.  Every 4-6 hours:  If on nasal continuous positive airway pressure, respiratory therapy assess nares and determine need for appliance change or resting period.  Outcome: Progressing     Problem: Cardiovascular - Adult  Goal: Maintains optimal cardiac output and hemodynamic stability  Outcome: Progressing  Goal: Absence of cardiac dysrhythmias or at baseline  Outcome: Progressing     Problem: Safety - Adult  Goal: Free from fall injury  Outcome: Progressing     Problem: ABCDS Injury Assessment  Goal: Absence of physical injury  Outcome: Progressing     Problem: Pain  Goal: Verbalizes/displays adequate comfort level or baseline comfort level  Outcome: Progressing     Problem: Chronic Conditions and Co-morbidities  Goal: Patient's chronic conditions and co-morbidity symptoms are monitored and maintained or improved  Outcome: Progressing

## 2024-07-03 NOTE — PROGRESS NOTES
Dr. Valente at bedside stated that patient can transfer out of ICU. Notified him that the patient is complaining of left arm pain, weakness, and warmth. Also notified him that orthostatic blood pressures are positive.

## 2024-07-03 NOTE — PROGRESS NOTES
Internal Medicine Progress Note    Patient's name: Shante Ragsdale  : 1940  Chief complaints (on day of admission): Loss of Consciousness (Ems was called for a fall. Pt fell back and ems reports asystole. 1min cpr performed and pt now awake and alert)  Admission date: 2024  Date of service: 7/3/2024   Room: [address]  Primary care physician: Taisha Taylor DO  Reason for visit: Follow-up for LOC     Subjective  Shante BOSTON was seen and examined     Doing well   Orthostatic today   Ok for transfer to floors   PT OT to see   10/24   She will need placement   Her BP was up today   Spoke with nursing     Review of Systems   There are no new complaints of chest pain, shortness of breath, abdominal pain, nausea, vomiting, diarrhea, constipation unless otherwise mentioned above.     Hospital Medications  Current Facility-Administered Medications   Medication Dose Route Frequency Provider Last Rate Last Admin    sodium chloride flush 0.9 % injection 5-40 mL  5-40 mL IntraVENous 2 times per day Angella Briceño MD   10 mL at 24 0756    sodium chloride flush 0.9 % injection 5-40 mL  5-40 mL IntraVENous PRN Angella Briceño MD        pantoprazole (PROTONIX) tablet 40 mg  40 mg Oral QAM AC Angella Briceño MD   40 mg at 24 0657    sodium chloride flush 0.9 % injection 5-40 mL  5-40 mL IntraVENous 2 times per day Angella Briceño MD   10 mL at 24 0755    sodium chloride flush 0.9 % injection 5-40 mL  5-40 mL IntraVENous PRN Angella Briceño MD        0.9 % sodium chloride infusion   IntraVENous PRN Angella Briceño  mL/hr at 24 2354 New Bag at 24 2354    potassium chloride (KLOR-CON M) extended release tablet 40 mEq  40 mEq Oral PRN Angella Briceño MD        Or    potassium bicarb-citric acid (EFFER-K) effervescent tablet 40 mEq  40 mEq Oral PRN Angella Briceño MD   40 mEq at 24 0612    Or    potassium chloride 10 mEq/100 mL IVPB (Peripheral Line)  10 mEq IntraVENous PRN Angella Briceño MD         07/03/2024    BUN 11 07/03/2024    CO2 21 (L) 07/03/2024    GLUCOSE 121 (H) 07/03/2024    ALT 23 06/29/2024    AST 28 06/29/2024    APTT 23.9 (L) 06/29/2024    TSH 0.05 (L) 06/12/2024       XR CHEST PORTABLE   Final Result   1. No acute cardiopulmonary disease.   2. Dual-chamber cardiac pacemaker in place. No evidence of a pneumothorax.         XR CHEST PORTABLE   Final Result   No acute process.      No pneumothorax post pacemaker placement.      Borderline cardiomegaly.         CT CHEST W CONTRAST   Final Result   1. No signs of an acute cardiopulmonary process.   2. Large fixed hiatus hernia.   3. There is fluid within the esophagus that suggests signs of significant   reflux.   4. Slight fullness of the intrahepatic ducts and correlation with biliary   enzymes may be helpful to determine the significance of this finding.         CT CSpine W/O Contrast   Final Result   1. There is no acute compression fracture or subluxation of the cervical   spine.   2. Multilevel degenerative disc and degenerative joint disease.         CT Head W/O Contrast   Final Result   1.  There is no acute intracranial abnormality.  Specifically, there is no   intracranial hemorrhage.   2. Atrophy and periventricular microvascular ischemic disease,   3. Old lacunar infarct within the left basal ganglia.   .         XR CHEST PORTABLE   Final Result   No acute process.         XR ANKLE LEFT (MIN 3 VIEWS)   Final Result   No radiographic evidence for fracture.      RECOMMENDATION:   Short-term follow-up radiographs in 7-10 days or cross-sectional imaging   (CT/MRI) if there is persistent concern for fracture or other traumatic   process or the symptoms persist.             Echocardiogram       Assessment   Active Hospital Problems    Diagnosis     S/P placement of cardiac pacemaker [Z95.0]      Priority: High    Complete heart block (HCC) [I44.2]     Heart block [I45.9]     Syncope and collapse [R55]     Primary hypertension [I10]

## 2024-07-03 NOTE — PROGRESS NOTES
Physical Therapy  Physical Therapy Treatment Note    Name: Shante Ragsdale  : 1940  MRN: [id number]      Date of Service: 7/3/2024    Evaluating PT:  Stoney Lopez, PT, DPT [id number]    Room #:  3815/3815-A  Diagnosis:  Syncope and collapse [R55]  Heart block [I45.9]  NSTEMI (non-ST elevated myocardial infarction) (HCC) [I21.4]  Fall from bed, subsequent encounter [W06.XXXD]  S/P placement of cardiac pacemaker [Z95.0]  PMHx/PSHx:  No past medical history on file.  Procedure/Surgery:   pacemaker placement  Precautions:  Falls, pacemaker, , chair alarm, mild [place], ?orthostatic hypotension  Equipment Needs:  TBD    SUBJECTIVE:    Pt lives alone in a 2 story home, 1st floor setup, with 1 step(s) to enter and no rail(s).      Pt ambulated with SPC in community and was independent PTA.    OBJECTIVE:   Initial Evaluation  Date: 24 Treatment  7/3/24 Short Term/ Long Term   Goals   AM-PAC 6 Clicks 14/24 10/24    Was pt agreeable to Eval/treatment? Yes Yes    Does pt have pain? No c/o pain \"Severe\" LUE pain -- RN aware and notified MD    Bed Mobility  Rolling: NT  Supine to sit: ModA with HOB elevated  Sit to supine: NT  Scooting: ModA Rolling: NT  Supine to sit: ModA with HOB elevated  Sit to supine: NT  Scooting: ModA Mod Independent    Transfers Sit to stand: Vikas  Stand to sit: Vikas  Stand pivot: Vikas with HHA or SPC Sit to stand: ModA  Stand to sit: ModA  Stand pivot: ModA with SPC Mod Independent with SPC   Ambulation   40 feet with Vikas with SPC A few steps to chair with ModA with SPC >400 feet with Mod Independent with SPC   Stair negotiation: ascended and descended NT NT >4 steps with 1 rail Mod Independent   ROM BUE:  Defer to OT note  BLE:  WFL     Strength BUE:  Defer to OT note  BLE:  4/5  Increase by 1/3 MMT grade   Balance Sitting EOB:  ModA improving to Vikas  Dynamic Standing:  Vikas with SPC Sitting EOB:  ModA improving to Vikas  Dynamic Standing:  ModA with SPC Sitting EOB:  Independent  Dynamic  Notified CM of decline in functional mobility and barriers to discharging home.     Treatment:  Patient practiced and was instructed in the following treatment:    Bed mobility training - pt given verbal and tactile cues to facilitate proper sequencing and safety during supine>sit as well as provided with physical assistance.  Sitting EOB for >5  minutes for upright tolerance, postural awareness and BLE ROM  Transfer training - pt was given verbal and tactile cues to facilitate proper hand placement, technique and safety during sit to stand, stand to sit and stand pivot transfers as well as provided with physical assistance.  Gait training- pt was given verbal and tactile cues to facilitate safety and balance during ambulation as well as provided with physical assistance.    PLAN:    Patient has regressed in progress towards established goals - possibly due to syncopal episode yesterday and increased pain.  Will continue with current POC.      Time in  1000  Time out  1025    Total Treatment Time  25 minutes     CPT codes:  [] Gait training 98080 - minutes  [] Manual therapy 46834 - minutes  [x] Therapeutic activities 44842 25 minutes  [] Therapeutic exercises 56084 - minutes  [] Neuromuscular reeducation 67708 - minutes    Stoney Lopez, PT, DPT  [id number]

## 2024-07-03 NOTE — DISCHARGE INSTR - COC
Continuity of Care Form    Patient Name: Shante Ragsdale   :  1940  MRN:  [id number]    Admit date:  2024  Discharge date:  2024    Code Status Order: Full Code   Advance Directives:     Admitting Physician:  Jason Valente MD  PCP: Taisha Taylor DO    Discharging Nurse: Sary Doran RN  Discharging Hospital Unit/Room#: 3815/3815-A  Discharging Unit Phone Number: [phone]    Emergency Contact:   Extended Emergency Contact Information  Primary Emergency Contact: Jada Ragsdale  Home Phone: [phone]  Mobile Phone: [phone]  Relation: Daughter-in-Law  Secondary Emergency Contact: Sudha Ragsdale  Home Phone: [phone]  Mobile Phone: [phone]  Relation: Grandchild    Past Surgical History:  Past Surgical History:   Procedure Laterality Date    APPENDECTOMY      BACK SURGERY      CARDIAC PROCEDURE N/A 2024    Insert temporary pacemaker performed by Stanford Shah MD at [facility] CARDIAC CATH LAB    CERVICAL FUSION      CHOLECYSTECTOMY      EP DEVICE PROCEDURE N/A 2024    Insert PPM dual performed by Angella Briceño MD at [facility] CARDIAC CATH LAB    HERNIA REPAIR      HYSTERECTOMY (CERVIX STATUS UNKNOWN)      JOINT REPLACEMENT      TONSILLECTOMY         Immunization History:   Immunization History   Administered Date(s) Administered    Zoster Recombinant (Shingrix) 2018, 2019       Active Problems:  Patient Active Problem List   Diagnosis Code    Other hyperlipidemia E78.49    Gastroesophageal reflux disease without esophagitis K21.9    Spondylosis of cervical region without myelopathy or radiculopathy M47.812    Vitamin D deficiency E55.9    HNP (herniated nucleus pulposus), cervical M50.20    Spinal stenosis of lumbar region without neurogenic claudication M48.061    Primary hypertension I10    LOPES (dyspnea on exertion) R06.09    Syncope and collapse R55    Heart block I45.9    Complete heart block (HCC) I44.2    S/P placement of cardiac pacemaker Z95.0

## 2024-07-03 NOTE — NURSE NAVIGATOR
ARRHYTHMIA EDUCATION     Left red ep educational folder in patients room. She was resting/sleeping and no family was present. I provided info for patient and family to review related to CHB  & PPM Insertion    Info on the following topics: The Heart's Electrical System, CHB, PPM, Post Operative Care of PPM,  Arm Restrictions, & PPM DC Instructions     I provided my contact info for any questions.

## 2024-07-03 NOTE — PROGRESS NOTES
OCCUPATIONAL THERAPY TREATMENT NOTE   [facility]  [address] [place], [place]       Date:7/3/2024                                                               Patient Name: Shante Ragsdale  MRN: [id number]  : 1940  Room: [address] [place], [place] [address]    Evaluating OT: Mckenzie Diallo, OTR/L [id number]     Referring Provider:   Authorizing   Jason Valente MD       Specific Provider Orders/Date: OT eval and treat (24)        Diagnosis: Syncope and collapse [R55]  Heart block [I45.9]  NSTEMI (non-ST elevated myocardial infarction) (HCC) [I21.4]  Fall from bed, subsequent encounter [W06.XXXD]  S/P placement of cardiac pacemaker [Z95.0]       Reason for admission; 84-year-old  female who presented to the emergency room complaining of syncope. She has syncopal episode. EMS reports asystole. CPR was performed. She was seen in the emergency room at which time she was in sinus rhythm with first-degree heart block.         Surgery/Procedures:        TVP      PPM        Pertinent Medical History: recurrent syncope   Past Medical History   No past medical history on file.          *Precautions:  Fall Risk, pacemaker, [place], orthostatic, alarms, new onset L UE pain/weakness     Assessment of current deficits   [x] Functional mobility          [x]ADLs           [x] Strength                  [x]Cognition   [x] Functional transfers        [x] IADLs         [x] Safety Awareness   [x]Endurance   [x] Fine Coordination           [x] ROM           [] Vision/perception    []Sensation     []Gross Motor Coordination [x] Balance    [] Delirium                  []Motor Control     [] Communication     OT PLAN OF CARE   OT POC based on physician orders, patient diagnosis and results of clinical assessment.        Frequency/Duration: 1-3 days/wk for 1-2 weeks PRN    Specific OT Treatment to include:   ADL retraining/adapted techniques and AE recommendations to increase

## 2024-07-03 NOTE — PROGRESS NOTES
Report called to PCCU. Transport Requested. The following patient belongings:  Dentures (Upper), Dentures (Lower), Glasses, Cell Phone, Cell Phone , Personal Electronic Device, and Other (PPM station)  were gathered and placed with patient on transfer upon transfer to PCCU. Family notified in person, updated on new unit and room number.

## 2024-07-03 NOTE — PLAN OF CARE
Problem: Discharge Planning  Goal: Discharge to home or other facility with appropriate resources  7/3/2024 0814 by Alyssa Osborn RN  Outcome: Progressing    Problem: Chronic Conditions and Co-morbidities  Goal: Patient's chronic conditions and co-morbidity symptoms are monitored and maintained or improved  7/3/2024 0814 by Alyssa Osborn RN  Outcome: Progressing  Flowsheets (Taken 7/2/2024 0819)  Care Plan - Patient's Chronic Conditions and Co-Morbidity Symptoms are Monitored and Maintained or Improved:   Update acute care plan with appropriate goals if chronic or comorbid symptoms are exacerbated and prevent overall improvement and discharge   Collaborate with multidisciplinary team to address chronic and comorbid conditions and prevent exacerbation or deterioration   Monitor and assess patient's chronic conditions and comorbid symptoms for stability, deterioration, or improvement   Discharge to home or other facility with appropriate resources:   Identify barriers to discharge with patient and caregiver   Arrange for needed discharge resources and transportation as appropriate   Identify discharge learning needs (meds, wound care, etc)   Arrange for interpreters to assist at discharge as needed   Refer to discharge planning if patient needs post-hospital services based on physician order or complex needs related to functional status, cognitive ability or social support system     Problem: Cardiovascular - Adult  Goal: Maintains optimal cardiac output and hemodynamic stability  7/3/2024 0814 by Alyssa Osborn RN  Outcome: Progressing  Flowsheets (Taken 7/2/2024 0819)  Maintains optimal cardiac output and hemodynamic stability:   Administer vasoactive medications as ordered   Administer fluid and/or volume expanders as ordered   Assess for signs of decreased cardiac output   Monitor urine output and notify Licensed Independent Practitioner for values outside of normal range   Monitor blood pressure and heart rate

## 2024-07-03 NOTE — PROGRESS NOTES
Patient arrived to PCCU. VSS.    Family member, Jada, at bedside.     Belongings: eyeglasses, phone, , flowers, upper dentures, wedding ring, and pacemaker box

## 2024-07-03 NOTE — PROGRESS NOTES
Call from lab regarding concern of contamination of patient's BMP, notified them that the CBC also needs to be rejected then because they were drawn at the same time from the same site. Labs will be redrawn.

## 2024-07-04 PROCEDURE — 2580000003 HC RX 258: Performed by: INTERNAL MEDICINE

## 2024-07-04 PROCEDURE — 6370000000 HC RX 637 (ALT 250 FOR IP): Performed by: INTERNAL MEDICINE

## 2024-07-04 PROCEDURE — 2140000000 HC CCU INTERMEDIATE R&B

## 2024-07-04 PROCEDURE — 2580000003 HC RX 258: Performed by: STUDENT IN AN ORGANIZED HEALTH CARE EDUCATION/TRAINING PROGRAM

## 2024-07-04 RX ADMIN — SODIUM CHLORIDE, PRESERVATIVE FREE 10 ML: 5 INJECTION INTRAVENOUS at 21:19

## 2024-07-04 RX ADMIN — PANTOPRAZOLE SODIUM 40 MG: 40 TABLET, DELAYED RELEASE ORAL at 05:12

## 2024-07-04 RX ADMIN — SODIUM CHLORIDE, PRESERVATIVE FREE 10 ML: 5 INJECTION INTRAVENOUS at 08:24

## 2024-07-04 RX ADMIN — SODIUM CHLORIDE, POTASSIUM CHLORIDE, SODIUM LACTATE AND CALCIUM CHLORIDE: 600; 310; 30; 20 INJECTION, SOLUTION INTRAVENOUS at 05:12

## 2024-07-04 ASSESSMENT — PAIN SCALES - GENERAL
PAINLEVEL_OUTOF10: 0
PAINLEVEL_OUTOF10: 0

## 2024-07-04 NOTE — PROGRESS NOTES
Internal Medicine Progress Note    Patient's name: Shante Ragsdale  : 1940  Chief complaints (on day of admission): Loss of Consciousness (Ems was called for a fall. Pt fell back and ems reports asystole. 1min cpr performed and pt now awake and alert)  Admission date: 2024  Date of service: 2024   Room: [address]  Primary care physician: Taisha Taylor DO  Reason for visit: Follow-up for LOC     Subjective  Shante BOSTON was seen and examined     Doing well   Family at bedside   Felt better with standing today   Continue on gentle fluids to complete 24 hours  DC planning anticipate tomorrow     Review of Systems   There are no new complaints of chest pain, shortness of breath, abdominal pain, nausea, vomiting, diarrhea, constipation unless otherwise mentioned above.     Hospital Medications  Current Facility-Administered Medications   Medication Dose Route Frequency Provider Last Rate Last Admin    lactated ringers IV soln infusion   IntraVENous Continuous Jason Valente MD 75 mL/hr at 24 0512 New Bag at 24 0512    sodium chloride flush 0.9 % injection 5-40 mL  5-40 mL IntraVENous 2 times per day Angella Briceño MD   10 mL at 246    sodium chloride flush 0.9 % injection 5-40 mL  5-40 mL IntraVENous PRN Angella Briceño MD        pantoprazole (PROTONIX) tablet 40 mg  40 mg Oral QAM AC Angella Briceño MD   40 mg at 24 0512    sodium chloride flush 0.9 % injection 5-40 mL  5-40 mL IntraVENous 2 times per day Angella Briceño MD   10 mL at 24 0824    sodium chloride flush 0.9 % injection 5-40 mL  5-40 mL IntraVENous PRN Angella Briceño MD        0.9 % sodium chloride infusion   IntraVENous PRN Angella Briceño MD   Stopped at 24 0434    potassium chloride (KLOR-CON M) extended release tablet 40 mEq  40 mEq Oral PRN Angella Briceño MD        Or    potassium bicarb-citric acid (EFFER-K) effervescent tablet 40 mEq  40 mEq Oral PRN Angella Briceño MD   40 mEq at 24 0612    Or     potassium chloride 10 mEq/100 mL IVPB (Peripheral Line)  10 mEq IntraVENous PRN Angella Briceño MD        magnesium sulfate 2000 mg in 50 mL IVPB premix  2,000 mg IntraVENous PRN Angella Briceño MD        polyethylene glycol (GLYCOLAX) packet 17 g  17 g Oral Daily PRN Angella Briceño MD        acetaminophen (TYLENOL) tablet 650 mg  650 mg Oral Q6H PRN Angella Briceño MD   650 mg at 07/02/24 0028    Or    acetaminophen (TYLENOL) suppository 650 mg  650 mg Rectal Q6H PRN Angella Briceño MD        ondansetron (ZOFRAN-ODT) disintegrating tablet 4 mg  4 mg Oral Q8H PRN Angella Briceño MD        Or    ondansetron (ZOFRAN) injection 4 mg  4 mg IntraVENous Q6H PRN Angella Briceño MD   4 mg at 07/02/24 1005       PRN Medications  sodium chloride flush, sodium chloride flush, sodium chloride, potassium chloride **OR** potassium alternative oral replacement **OR** potassium chloride, magnesium sulfate, polyethylene glycol, acetaminophen **OR** acetaminophen, ondansetron **OR** ondansetron    Objective  Most Recent Recorded Vitals  /64   Pulse (!) 104   Temp 97.2 °F (36.2 °C) (Temporal)   Resp 18   Ht 1.397 m (4' 7\")   Wt 55.2 kg (121 lb 11.1 oz)   SpO2 93%   BMI 28.28 kg/m²   I/O last 3 completed shifts:  In: 3194.3 [P.O.:600; I.V.:2594.3]  Out: 1050 [Urine:1050]  I/O this shift:  In: 240 [P.O.:240]  Out: -     Physical Exam:   General: AAO to person/place/time/purpose, NAD, no labored breathing  Eyes: conjunctivae/corneas clear, sclera non icteric  Skin: color/texture/turgor normal, no rashes or lesions  Lungs: CTAB, no retractions/use of accessory muscles, no vocal fremitus, no rhonchi, no crackle, no rales  Heart: regular rate, regular rhythm, no murmur  Abdomen: soft, NT, bowel sounds normal  Extremities: atraumatic, no edema  Neurologic: cranial nerves 2-12 grossly intact, no slurred speech    Most Recent Labs  Lab Results   Component Value Date    WBC 12.1 (H) 07/03/2024    HGB 11.4 (L) 07/03/2024    HCT 33.8 (L)

## 2024-07-04 NOTE — PLAN OF CARE
Problem: Cardiovascular - Adult  Goal: Absence of cardiac dysrhythmias or at baseline  Outcome: Progressing     Problem: Cardiovascular - Adult  Goal: Maintains optimal cardiac output and hemodynamic stability  7/3/2024 2202 by Kate Amaral, RN  Outcome: Progressing     Problem: Skin/Tissue Integrity  Goal: Absence of new skin breakdown  Description: 1.  Monitor for areas of redness and/or skin breakdown  2.  Assess vascular access sites hourly  3.  Every 4-6 hours minimum:  Change oxygen saturation probe site  4.  Every 4-6 hours:  If on nasal continuous positive airway pressure, respiratory therapy assess nares and determine need for appliance change or resting period.  Outcome: Progressing     Problem: Discharge Planning  Goal: Discharge to home or other facility with appropriate resources  7/3/2024 2202 by Kate Amraal, RN  Outcome: Progressing     Problem: Safety - Adult  Goal: Free from fall injury  Outcome: Progressing     Problem: ABCDS Injury Assessment  Goal: Absence of physical injury  Outcome: Progressing     Problem: Pain  Goal: Verbalizes/displays adequate comfort level or baseline comfort level  Outcome: Progressing     Problem: Chronic Conditions and Co-morbidities  Goal: Patient's chronic conditions and co-morbidity symptoms are monitored and maintained or improved  7/3/2024 2202 by Kate Amaral, RN  Outcome: Progressing

## 2024-07-04 NOTE — PLAN OF CARE
Problem: Discharge Planning  Goal: Discharge to home or other facility with appropriate resources  7/4/2024 1100 by Lisseth Azul RN  Outcome: Progressing  7/3/2024 2202 by Kate Amaral RN  Outcome: Progressing     Problem: Skin/Tissue Integrity  Goal: Absence of new skin breakdown  Description: 1.  Monitor for areas of redness and/or skin breakdown  2.  Assess vascular access sites hourly  3.  Every 4-6 hours minimum:  Change oxygen saturation probe site  4.  Every 4-6 hours:  If on nasal continuous positive airway pressure, respiratory therapy assess nares and determine need for appliance change or resting period.  7/4/2024 1100 by Lisseth Azul RN  Outcome: Progressing  7/3/2024 2202 by Kate Amaral RN  Outcome: Progressing     Problem: Cardiovascular - Adult  Goal: Maintains optimal cardiac output and hemodynamic stability  7/4/2024 1100 by Lisseth Azul RN  Outcome: Progressing  7/3/2024 2202 by Kate Amaral RN  Outcome: Progressing  Goal: Absence of cardiac dysrhythmias or at baseline  7/3/2024 2202 by Kate Amaral RN  Outcome: Progressing     Problem: Safety - Adult  Goal: Free from fall injury  7/4/2024 1100 by Lisseth Azul RN  Outcome: Progressing  7/3/2024 2202 by Kate Amaral RN  Outcome: Progressing     Problem: ABCDS Injury Assessment  Goal: Absence of physical injury  7/4/2024 1100 by Lisseth Azul RN  Outcome: Progressing  7/3/2024 2202 by Kate Amaral RN  Outcome: Progressing     Problem: Pain  Goal: Verbalizes/displays adequate comfort level or baseline comfort level  7/4/2024 1100 by Lisseth Azul RN  Outcome: Progressing  7/3/2024 2202 by Kate Amaral RN  Outcome: Progressing     Problem: Chronic Conditions and Co-morbidities  Goal: Patient's chronic conditions and co-morbidity symptoms are monitored and maintained or improved  7/4/2024 1100 by Lisseth Azul RN  Outcome: Progressing  7/3/2024 2202 by Kate Amaral RN  Outcome: Progressing

## 2024-07-05 VITALS
TEMPERATURE: 98.2 F | RESPIRATION RATE: 21 BRPM | BODY MASS INDEX: 28.16 KG/M2 | WEIGHT: 121.69 LBS | HEIGHT: 55 IN | DIASTOLIC BLOOD PRESSURE: 40 MMHG | OXYGEN SATURATION: 98 % | HEART RATE: 100 BPM | SYSTOLIC BLOOD PRESSURE: 133 MMHG

## 2024-07-05 PROCEDURE — 2580000003 HC RX 258: Performed by: INTERNAL MEDICINE

## 2024-07-05 PROCEDURE — 6370000000 HC RX 637 (ALT 250 FOR IP): Performed by: INTERNAL MEDICINE

## 2024-07-05 RX ORDER — POLYETHYLENE GLYCOL 3350 17 G/17G
17 POWDER, FOR SOLUTION ORAL DAILY
Qty: 527 G | Refills: 1 | DISCHARGE
Start: 2024-07-05 | End: 2024-09-05

## 2024-07-05 RX ADMIN — PANTOPRAZOLE SODIUM 40 MG: 40 TABLET, DELAYED RELEASE ORAL at 06:11

## 2024-07-05 RX ADMIN — SODIUM CHLORIDE, PRESERVATIVE FREE 10 ML: 5 INJECTION INTRAVENOUS at 09:18

## 2024-07-05 NOTE — PLAN OF CARE
Problem: Discharge Planning  Goal: Discharge to home or other facility with appropriate resources  7/4/2024 2326 by Kate Amaral RN  Outcome: Progressing     Problem: Skin/Tissue Integrity  Goal: Absence of new skin breakdown  Description: 1.  Monitor for areas of redness and/or skin breakdown  2.  Assess vascular access sites hourly  3.  Every 4-6 hours minimum:  Change oxygen saturation probe site  4.  Every 4-6 hours:  If on nasal continuous positive airway pressure, respiratory therapy assess nares and determine need for appliance change or resting period.  7/4/2024 2326 by Kate Amaral RN  Outcome: Progressing     Problem: Cardiovascular - Adult  Goal: Maintains optimal cardiac output and hemodynamic stability  7/4/2024 2326 by Kate Amaral RN  Outcome: Progressing     Problem: Cardiovascular - Adult  Goal: Absence of cardiac dysrhythmias or at baseline  Outcome: Progressing     Problem: Safety - Adult  Goal: Free from fall injury  7/4/2024 2326 by Kate Amaral RN  Outcome: Progressing     Problem: ABCDS Injury Assessment  Goal: Absence of physical injury  7/4/2024 2326 by Kate Amaral RN  Outcome: Progressing     Problem: Pain  Goal: Verbalizes/displays adequate comfort level or baseline comfort level  7/4/2024 2326 by Kate Amaral RN  Outcome: Progressing     Problem: Chronic Conditions and Co-morbidities  Goal: Patient's chronic conditions and co-morbidity symptoms are monitored and maintained or improved  7/4/2024 2326 by Kate Amaral RN  Outcome: Progressing

## 2024-07-05 NOTE — PROGRESS NOTES
Patient discharged with all belongings via Facility Transport. Patient educated on pacemaker instructions and incision care. All questions answered and understanding verbalized by patient. Peripheral IV removed without complication. Heart monitor removed and returned to nurse's station.

## 2024-07-05 NOTE — PROGRESS NOTES
Internal Medicine Progress Note    Patient's name: Shante Ragsdale  : 1940  Chief complaints (on day of admission): Loss of Consciousness (Ems was called for a fall. Pt fell back and ems reports asystole. 1min cpr performed and pt now awake and alert)  Admission date: 2024  Date of service: 2024   Room: [address]  Primary care physician: Taisha Taylor DO  Reason for visit: Follow-up for LOC     Subjective  Shante BOSTON was seen and examined     Doing well   No new issues at this time   Stood today without symptoms  Cleared for DC medically stable   Spoke with CM SW     Review of Systems   There are no new complaints of chest pain, shortness of breath, abdominal pain, nausea, vomiting, diarrhea, constipation unless otherwise mentioned above.     Hospital Medications  Current Facility-Administered Medications   Medication Dose Route Frequency Provider Last Rate Last Admin    sodium chloride flush 0.9 % injection 5-40 mL  5-40 mL IntraVENous 2 times per day Angella Briceño MD   10 mL at 24 0918    sodium chloride flush 0.9 % injection 5-40 mL  5-40 mL IntraVENous PRN Angella Briceño MD        pantoprazole (PROTONIX) tablet 40 mg  40 mg Oral QAM AC Angella Briceño MD   40 mg at 24 0611    sodium chloride flush 0.9 % injection 5-40 mL  5-40 mL IntraVENous 2 times per day Angella Briceño MD   10 mL at 24 0918    sodium chloride flush 0.9 % injection 5-40 mL  5-40 mL IntraVENous PRN Angella Briceño MD        0.9 % sodium chloride infusion   IntraVENous PRN Angella Briceño MD   Stopped at 24 0434    potassium chloride (KLOR-CON M) extended release tablet 40 mEq  40 mEq Oral PRN Angella Briceño MD        Or    potassium bicarb-citric acid (EFFER-K) effervescent tablet 40 mEq  40 mEq Oral PRN Angella Briceño MD   40 mEq at 24 0612    Or    potassium chloride 10 mEq/100 mL IVPB (Peripheral Line)  10 mEq IntraVENous PRN Angella Briceño MD        magnesium sulfate 2000 mg in 50 mL IVPB premix  2,000  pacemaker placement  EP consultation   CVICU admission   PPM today     Syncope   Probably orthostatic in nature   Fall precautions   Encourage intake   PT OT to see     Medications, labs and imaging reviewed   Discharge plan: DC to SNF once arranged medically stable and cleared     Electronically signed by Jason Valente MD on 7/5/2024 at 12:20 PM    I can be reached through [employer].

## 2024-07-05 NOTE — CARE COORDINATION
7/5/24  Transition of Care update.  Patient is s/p pacer placement. Discharge goal is a RAYA stay at [facility].  Pre-cert started and obtained.  Updated ortho's complete per facility request.  Facility will provide transport and will pick patient up at 1:45 pm.  Nursing to call the nurse to nurse. Call to family to update on discharge arrangements. PASRR complete, ALEXIA and destination complete.  SW/CM to follow.    Electronically signed by SLOANE Ricks on 7/5/2024 at 10:38 AM

## 2024-07-05 NOTE — PLAN OF CARE
Problem: Chronic Conditions and Co-morbidities  Goal: Patient's chronic conditions and co-morbidity symptoms are monitored and maintained or improved  7/5/2024 1247 by Sary Doran RN  Outcome: Adequate for Discharge     Problem: Cardiovascular - Adult  Goal: Maintains optimal cardiac output and hemodynamic stability  7/5/2024 1247 by Sary Doran RN  Outcome: Adequate for Discharge     Problem: Cardiovascular - Adult  Goal: Absence of cardiac dysrhythmias or at baseline  7/5/2024 1247 by Sary Doran RN  Outcome: Adequate for Discharge     Problem: Safety - Adult  Goal: Free from fall injury  7/5/2024 1247 by Sary Doran RN  Outcome: Adequate for Discharge     Problem: ABCDS Injury Assessment  Goal: Absence of physical injury  7/5/2024 1247 by Sary Doran RN  Outcome: Adequate for Discharge     Problem: Pain  Goal: Verbalizes/displays adequate comfort level or baseline comfort level  7/5/2024 1247 by Sary Dorna RN  Outcome: Adequate for Discharge     Problem: Skin/Tissue Integrity  Goal: Absence of new skin breakdown  7/5/2024 1247 by Sary Doran RN  Outcome: Adequate for Discharge     Problem: Discharge Planning  Goal: Discharge to home or other facility with appropriate resources  7/5/2024 1247 by Sary Doran RN  Outcome: Adequate for Discharge

## 2024-07-05 NOTE — FLOWSHEET NOTE
Orthostatic B/P and Pulse   07/05/24 0930   Vitals   Pulse (!) 104   Heart Rate Source Telemetry   Respirations 22   /63   MAP (Calculated) 87   MAP (mmHg) 84   BP Location Right upper arm   BP Upper/Lower Upper   BP Method Automatic   Orthostatic B/P and Pulse? Yes   Blood Pressure Lying 136/63   Pulse Lying 100 PER MINUTE   Blood Pressure Sitting 104/86   Pulse Sitting 108 PER MINUTE   Blood Pressure Standing 115/74   Pulse Standing 122 PER MINUTE   Cardiac Rhythm Sinus rhythm;Sinus tachy;Ventricular paced   Oxygen Therapy   SpO2 98 %   Pulse Oximeter Device Mode Intermittent   Pulse Oximeter Device Location Finger   O2 Device None (Room air)

## 2024-07-08 NOTE — DISCHARGE SUMMARY
Internal Medicine Discharge Summary    NAME: Shante Ragsdale :  1940  MRN:  [id number] PCP:Taisha Taylor DO    ADMITTED: 2024   DISCHARGED: 2024  2:04 PM    ADMITTING PHYSICIAN: Ivy att. providers found    PCP: Taisha Taylor DO    CONSULTANT(S):   IP CONSULT TO ELECTROPHYSIOLOGY  IP CONSULT TO HOSPITALIST     ADMITTING DIAGNOSIS:   Syncope and collapse [R55]  Heart block [I45.9]  NSTEMI (non-ST elevated myocardial infarction) (HCC) [I21.4]  Fall from bed, subsequent encounter [W06.XXXD]  S/P placement of cardiac pacemaker [Z95.0]     Please see H&P for further details    DISCHARGE DIAGNOSES:   Active Hospital Problems    Diagnosis     S/P placement of cardiac pacemaker [Z95.0]      Priority: High    Complete heart block (HCC) [I44.2]     Heart block [I45.9]     Syncope and collapse [R55]     Primary hypertension [I10]        BRIEF HISTORY OF PRESENT ILLNESS: Shante Ragsdale is a 84 y.o. female patient of Taisha Taylor DO who  has no past medical history on file. who originally had concerns including Loss of Consciousness (Ems was called for a fall. Pt fell back and ems reports asystole. 1min cpr performed and pt now awake and alert). at presentation on 2024, and was found to have Syncope and collapse [R55]  Heart block [I45.9]  NSTEMI (non-ST elevated myocardial infarction) (HCC) [I21.4]  Fall from bed, subsequent encounter [W06.XXXD]  S/P placement of cardiac pacemaker [Z95.0] after workup.    Please see H&P for further details.    HOSPITAL COURSE:   The patient presented to the hospital with the chief complaint of Loss of Consciousness (Ems was called for a fall. Pt fell back and ems reports asystole. 1min cpr performed and pt now awake and alert)  . The patient was admitted to the hospital.     Third-degree heart block  Status post emergent temporary pacemaker placement  EP consultation   CVICU admission   PPM today      Syncope   Probably orthostatic in nature   Fall precautions

## 2024-07-16 ENCOUNTER — NURSE ONLY (OUTPATIENT)
Dept: NON INVASIVE DIAGNOSTICS | Age: 84
End: 2024-07-16

## 2024-07-16 DIAGNOSIS — Z95.0 PACEMAKER: Primary | ICD-10-CM

## 2024-07-16 NOTE — PATIENT INSTRUCTIONS
Continue arm restrictions until follow up at device clinic   You may shower starting today     Call if any signs or symptoms of infection [phone] ext: [phone]  Fevers, chills, redness, swelling or drainage.       Keep monitor hooked up.   Donal ( [facility]) [employer] support ( home monitoring) [phone]

## 2024-07-16 NOTE — PROGRESS NOTES
See murj report.     Patient would like to follow with Dr. Briceño. Patient states that her office is closer to where she lives.     Plan: 6 week device clinic appointment, scheduled in [place].       Macy JARRETT,RN   [facility] [place]   Device Clinic

## 2024-08-14 ENCOUNTER — NURSE ONLY (OUTPATIENT)
Dept: NON INVASIVE DIAGNOSTICS | Age: 84
End: 2024-08-14

## 2024-08-14 DIAGNOSIS — R55 SYNCOPE AND COLLAPSE: ICD-10-CM

## 2024-08-14 DIAGNOSIS — I44.2 COMPLETE HEART BLOCK (HCC): ICD-10-CM

## 2024-08-14 DIAGNOSIS — Z95.0 PACEMAKER: Primary | ICD-10-CM

## 2025-01-27 ENCOUNTER — NURSE ONLY (OUTPATIENT)
Dept: NON INVASIVE DIAGNOSTICS | Age: 85
End: 2025-01-27

## 2025-01-27 DIAGNOSIS — Z95.0 PACEMAKER: ICD-10-CM

## 2025-01-27 DIAGNOSIS — R55 SYNCOPE AND COLLAPSE: Primary | ICD-10-CM

## 2025-01-27 DIAGNOSIS — I44.2 COMPLETE HEART BLOCK (HCC): ICD-10-CM

## 2025-05-19 PROCEDURE — 93294 REM INTERROG EVL PM/LDLS PM: CPT | Performed by: INTERNAL MEDICINE

## 2025-05-19 PROCEDURE — 93296 REM INTERROG EVL PM/IDS: CPT | Performed by: INTERNAL MEDICINE

## (undated) DEVICE — CABLE PACE L12FT ALGTR CLP DISP FOR PACE SYS ANALZR MERLIN

## (undated) DEVICE — CANNULA NSL CANN NSL L25FT TBNG AD O2 SUP SFT UC

## (undated) DEVICE — INTRODUCER LD L13CM OD6FR SPLITTABLE DIL W/ J TIP GWIRE SYR

## (undated) DEVICE — PAD, DEFIB, ADULT, RADIOTRAN, PHYSIO, LO: Brand: MEDLINE

## (undated) DEVICE — PENCIL ES BTTN SWCH W TIP HOLSTER E Z CLN

## (undated) DEVICE — PACK SURG CARDIAC CATH

## (undated) DEVICE — RADIFOCUS GLIDEWIRE: Brand: GLIDEWIRE

## (undated) DEVICE — SHEATH INTRO L12CM DIA6FR W/ LUERLOCK HUB HEMSTAS VLV